# Patient Record
Sex: FEMALE | Race: OTHER | NOT HISPANIC OR LATINO | URBAN - METROPOLITAN AREA
[De-identification: names, ages, dates, MRNs, and addresses within clinical notes are randomized per-mention and may not be internally consistent; named-entity substitution may affect disease eponyms.]

---

## 2017-02-01 ENCOUNTER — OUTPATIENT (OUTPATIENT)
Dept: OUTPATIENT SERVICES | Facility: HOSPITAL | Age: 72
LOS: 1 days | End: 2017-02-01
Payer: MEDICAID

## 2017-02-01 DIAGNOSIS — Z84.89 FAMILY HISTORY OF OTHER SPECIFIED CONDITIONS: Chronic | ICD-10-CM

## 2017-02-13 DIAGNOSIS — R69 ILLNESS, UNSPECIFIED: ICD-10-CM

## 2017-02-21 ENCOUNTER — OUTPATIENT (OUTPATIENT)
Dept: OUTPATIENT SERVICES | Facility: HOSPITAL | Age: 72
LOS: 1 days | End: 2017-02-21
Payer: COMMERCIAL

## 2017-02-21 ENCOUNTER — APPOINTMENT (OUTPATIENT)
Dept: RADIOLOGY | Facility: CLINIC | Age: 72
End: 2017-02-21

## 2017-02-21 DIAGNOSIS — Z00.8 ENCOUNTER FOR OTHER GENERAL EXAMINATION: ICD-10-CM

## 2017-02-21 DIAGNOSIS — Z84.89 FAMILY HISTORY OF OTHER SPECIFIED CONDITIONS: Chronic | ICD-10-CM

## 2017-02-21 PROCEDURE — 77080 DXA BONE DENSITY AXIAL: CPT | Mod: 26

## 2017-02-21 PROCEDURE — 77080 DXA BONE DENSITY AXIAL: CPT

## 2017-05-31 ENCOUNTER — OTHER (OUTPATIENT)
Age: 72
End: 2017-05-31

## 2017-06-08 ENCOUNTER — APPOINTMENT (OUTPATIENT)
Dept: ORTHOPEDIC SURGERY | Facility: CLINIC | Age: 72
End: 2017-06-08
Payer: MEDICARE

## 2017-06-08 VITALS
HEART RATE: 76 BPM | SYSTOLIC BLOOD PRESSURE: 117 MMHG | BODY MASS INDEX: 25.3 KG/M2 | WEIGHT: 134 LBS | HEIGHT: 61 IN | DIASTOLIC BLOOD PRESSURE: 61 MMHG

## 2017-06-08 PROCEDURE — 73502 X-RAY EXAM HIP UNI 2-3 VIEWS: CPT | Mod: LT

## 2017-06-08 PROCEDURE — 99213 OFFICE O/P EST LOW 20 MIN: CPT

## 2017-06-08 RX ORDER — CYCLOSPORINE 0.5 MG/ML
0.05 EMULSION OPHTHALMIC
Qty: 60 | Refills: 0 | Status: ACTIVE | COMMUNITY
Start: 2017-05-30

## 2017-08-08 ENCOUNTER — OTHER (OUTPATIENT)
Age: 72
End: 2017-08-08

## 2017-08-09 ENCOUNTER — APPOINTMENT (OUTPATIENT)
Dept: ORTHOPEDIC SURGERY | Facility: CLINIC | Age: 72
End: 2017-08-09
Payer: MEDICARE

## 2017-08-09 VITALS
HEART RATE: 70 BPM | HEIGHT: 61 IN | DIASTOLIC BLOOD PRESSURE: 77 MMHG | BODY MASS INDEX: 25.3 KG/M2 | WEIGHT: 134 LBS | SYSTOLIC BLOOD PRESSURE: 122 MMHG

## 2017-08-09 PROCEDURE — 99213 OFFICE O/P EST LOW 20 MIN: CPT

## 2017-08-09 PROCEDURE — 73523 X-RAY EXAM HIPS BI 5/> VIEWS: CPT

## 2017-09-01 PROCEDURE — G9001: CPT

## 2017-12-11 ENCOUNTER — APPOINTMENT (OUTPATIENT)
Dept: ORTHOPEDIC SURGERY | Facility: CLINIC | Age: 72
End: 2017-12-11
Payer: MEDICARE

## 2017-12-11 PROCEDURE — 99214 OFFICE O/P EST MOD 30 MIN: CPT

## 2017-12-11 PROCEDURE — 73080 X-RAY EXAM OF ELBOW: CPT | Mod: RT

## 2018-02-27 ENCOUNTER — APPOINTMENT (OUTPATIENT)
Dept: ORTHOPEDIC SURGERY | Facility: CLINIC | Age: 73
End: 2018-02-27

## 2018-03-17 ENCOUNTER — RX RENEWAL (OUTPATIENT)
Age: 73
End: 2018-03-17

## 2018-03-19 ENCOUNTER — RX RENEWAL (OUTPATIENT)
Age: 73
End: 2018-03-19

## 2018-04-18 ENCOUNTER — APPOINTMENT (OUTPATIENT)
Dept: UROLOGY | Facility: CLINIC | Age: 73
End: 2018-04-18
Payer: MEDICARE

## 2018-04-18 VITALS
BODY MASS INDEX: 20.2 KG/M2 | HEIGHT: 61 IN | WEIGHT: 107 LBS | DIASTOLIC BLOOD PRESSURE: 73 MMHG | HEART RATE: 78 BPM | SYSTOLIC BLOOD PRESSURE: 118 MMHG | RESPIRATION RATE: 16 BRPM

## 2018-04-18 DIAGNOSIS — Z86.69 PERSONAL HISTORY OF OTHER DISEASES OF THE NERVOUS SYSTEM AND SENSE ORGANS: ICD-10-CM

## 2018-04-18 DIAGNOSIS — Z82.49 FAMILY HISTORY OF ISCHEMIC HEART DISEASE AND OTHER DISEASES OF THE CIRCULATORY SYSTEM: ICD-10-CM

## 2018-04-18 PROCEDURE — 99205 OFFICE O/P NEW HI 60 MIN: CPT

## 2018-04-18 PROCEDURE — 51798 US URINE CAPACITY MEASURE: CPT

## 2018-04-18 RX ORDER — ESTRADIOL 0.1 MG/G
0.1 CREAM VAGINAL
Qty: 4 | Refills: 3 | Status: ACTIVE | COMMUNITY
Start: 2018-04-18 | End: 1900-01-01

## 2018-04-23 LAB
A VAGINAE DNA VAG QL NAA+PROBE: NORMAL
APPEARANCE: CLEAR
BACTERIA UR CULT: ABNORMAL
BACTERIA: NEGATIVE
BILIRUBIN URINE: NEGATIVE
BLOOD URINE: NEGATIVE
BVAB2 DNA VAG QL NAA+PROBE: NORMAL
C KRUSEI DNA VAG QL NAA+PROBE: NEGATIVE
COLOR: YELLOW
CORE LAB FLUID CYTOLOGY: NORMAL
GLUCOSE QUALITATIVE U: NEGATIVE MG/DL
HYALINE CASTS: 0 /LPF
KETONES URINE: NEGATIVE
LEUKOCYTE ESTERASE URINE: NEGATIVE
MEGA1 DNA VAG QL NAA+PROBE: NORMAL
MICROSCOPIC-UA: NORMAL
NITRITE URINE: NEGATIVE
PH URINE: 7
PROTEIN URINE: NEGATIVE MG/DL
RED BLOOD CELLS URINE: 1 /HPF
SPECIFIC GRAVITY URINE: 1.01
SQUAMOUS EPITHELIAL CELLS: 0 /HPF
T VAGINALIS RRNA SPEC QL NAA+PROBE: NEGATIVE
UROBILINOGEN URINE: NEGATIVE MG/DL
WHITE BLOOD CELLS URINE: 1 /HPF

## 2018-05-16 ENCOUNTER — OTHER (OUTPATIENT)
Age: 73
End: 2018-05-16

## 2018-05-17 ENCOUNTER — APPOINTMENT (OUTPATIENT)
Dept: ORTHOPEDIC SURGERY | Facility: CLINIC | Age: 73
End: 2018-05-17

## 2018-05-30 ENCOUNTER — OTHER (OUTPATIENT)
Age: 73
End: 2018-05-30

## 2018-06-07 ENCOUNTER — APPOINTMENT (OUTPATIENT)
Dept: ORTHOPEDIC SURGERY | Facility: CLINIC | Age: 73
End: 2018-06-07
Payer: MEDICARE

## 2018-06-07 VITALS
DIASTOLIC BLOOD PRESSURE: 69 MMHG | BODY MASS INDEX: 20.2 KG/M2 | HEIGHT: 61 IN | WEIGHT: 107 LBS | SYSTOLIC BLOOD PRESSURE: 113 MMHG | TEMPERATURE: 97.9 F | HEART RATE: 79 BPM

## 2018-06-07 PROCEDURE — 73562 X-RAY EXAM OF KNEE 3: CPT | Mod: 50

## 2018-06-07 PROCEDURE — 99213 OFFICE O/P EST LOW 20 MIN: CPT

## 2018-06-07 PROCEDURE — 73502 X-RAY EXAM HIP UNI 2-3 VIEWS: CPT | Mod: RT

## 2018-06-18 ENCOUNTER — APPOINTMENT (OUTPATIENT)
Dept: UROLOGY | Facility: CLINIC | Age: 73
End: 2018-06-18
Payer: MEDICARE

## 2018-06-18 PROCEDURE — 99214 OFFICE O/P EST MOD 30 MIN: CPT

## 2018-09-06 LAB
APPEARANCE: CLEAR
BACTERIA UR CULT: NORMAL
BACTERIA: NEGATIVE
BILIRUBIN URINE: NEGATIVE
BLOOD URINE: ABNORMAL
COLOR: YELLOW
CORE LAB FLUID CYTOLOGY: NORMAL
GLUCOSE QUALITATIVE U: NEGATIVE MG/DL
KETONES URINE: NEGATIVE
LEUKOCYTE ESTERASE URINE: NEGATIVE
MICROSCOPIC-UA: NORMAL
NITRITE URINE: NEGATIVE
PH URINE: 7
PROTEIN URINE: NEGATIVE MG/DL
RED BLOOD CELLS URINE: 0 /HPF
SPECIFIC GRAVITY URINE: 1.01
SQUAMOUS EPITHELIAL CELLS: 1 /HPF
UROBILINOGEN URINE: NEGATIVE MG/DL
WHITE BLOOD CELLS URINE: 0 /HPF

## 2018-09-27 ENCOUNTER — CHART COPY (OUTPATIENT)
Age: 73
End: 2018-09-27

## 2018-10-03 LAB — BACTERIA UR CULT: NORMAL

## 2018-12-05 ENCOUNTER — APPOINTMENT (OUTPATIENT)
Dept: UROLOGY | Facility: CLINIC | Age: 73
End: 2018-12-05
Payer: MEDICARE

## 2018-12-05 PROCEDURE — 99215 OFFICE O/P EST HI 40 MIN: CPT

## 2018-12-07 LAB — BACTERIA UR CULT: NORMAL

## 2019-04-01 ENCOUNTER — APPOINTMENT (OUTPATIENT)
Dept: ORTHOPEDIC SURGERY | Facility: CLINIC | Age: 74
End: 2019-04-01
Payer: COMMERCIAL

## 2019-04-01 PROCEDURE — 99213 OFFICE O/P EST LOW 20 MIN: CPT

## 2019-04-01 PROCEDURE — 73030 X-RAY EXAM OF SHOULDER: CPT | Mod: LT

## 2019-04-01 NOTE — HISTORY OF PRESENT ILLNESS
[de-identified] : Patient is here for right elbow pain that she states has been worsening over the past year without any injury. She states that she has been following up with a Rheumatologist for her RA and osteoporosis. She has been wearing a brace at home but feels like she needs a custom made one as hers does not fit properly. She does not take any outside pain medications. She states that her function is becoming limited and she is also noticing it in her left arm and shoulder as well.

## 2019-04-01 NOTE — PHYSICAL EXAM
[de-identified] : Constitutional: Well-nourished, well-developed, No acute distress\par Respiratory: Good respiratory effort, no SOB\par Lymphatic: No regional lymphadenopathy, no lymphedema\par Psychiatric: Pleasant and normal affect, alert and oriented x3\par Skin: Clean dry and intact B/L UE/LE\par Musculoskeletal: normal except where as noted in regional exam\par \par Left Shoulder:\par APPEARANCE: no marked deformities, no swelling or malalignment\par POSITIVE TENDERNESS: + Diffusely throughout the anterior/lateral/posterior shoulder, + anterior/posterior capsule\par NONTENDER: supraspinatus, infraspinatus, teres minor. biceps. AC joint. \par ROM: Significantly limited in all planes active and passive motion \par RESISTIVE TESTING: + pain, 4/5 resisted flex/ext, empty can/ER/IR, horizontal abd/add \par SPECIAL TESTS: + apley's scratch test for limited motion and pain, + drop arm for pain without sig weakness, + empty can for pain without sig weakness\par Vasc: 2+ radial pulse\par Neuro: AIN, PIN, Ulnar nerve intact to motor, DTRs 2+/4 biceps, triceps, brachioradialis\par Sensation: Intact to light touch throughout\par \par Bilateral Elbows:\par APPEARANCE: Mild swelling, + deformity with irregular articulation of the bilateral elbow joints \par POSITIVE TENDERNESS: Diffusely around all portions of the elbow\par NONTENDER: none \par ROM: full flexion, extension limited by approximately 10°, normal pronation/supination. \par RESISTIVE TESTING: Painful 4/5 resisted elbow flexion/extension, wrist/long finger extension. painless resisted wrist/long finger flexion. painless resisted supination/pronation. \par Vasc: 2+ radial pulse\par Neuro: AIN, PIN, Ulnar nerve intact to motor\par Sensation: Intact to light touch throughout\par  [de-identified] : The following radiographs were ordered and read by me during this patient's visit. I reviewed each radiograph in detail with the patient and discussed the findings as highlighted below. 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  No fracture, or dislocation seen at this time. There is no malalignment. No other obvious osseous abnormality. Otherwise unremarkable.\par \par

## 2019-04-01 NOTE — DISCUSSION/SUMMARY
[de-identified] : Patient was seen today for reevaluation of bilateral elbow pain. 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 Patient was encouraged to continue with physical therapy and activities as tolerated as she has good functional range of motion. At this time she has increased bilateral elbow pain, but still has the same level of function regarding her range of motion and ability to perform her ADLs. I advised the patient that a new course of physical therapy would be recommended at this time. I advised the patient that I am a nonsurgical sports medicine provider, I do not provide management severe end-stage rheumatoid joints, she is advised to continue management with her rheumatologist, however is she feels she can not proceed any further with nonsurgical management, she be recommended to follow up with one of my surgical associates to discuss total elbow replacement surgery. Patient is not interested in surgical intervention at this time.\par I educated the patient that she has endstage arthritis in her left shoulder as well, she is bone-on-bone deformation of the humeral head on the glenoid, as well as with bone-on-bone contact in the femoral head and the acromion process. Patient does not have any shoulder pain specifically, just stiffness and limited motion. I advised cortisone injections would be indicated for pain control, however physical therapy would be the best nonsurgical management at this time. If she wishes to have a consultation with one of my shoulder replacement surgeons, she may do so, but she is not interested in surgical intervention in this location either. Patient and her  appreciate and agree with current plan.  \par \par This note was generated using dragon medical dictation software.  A reasonable effort has been made for proofreading its contents, but typos may still remain.  If there are any questions or points of clarification needed please notify my office.\par \par

## 2019-04-08 ENCOUNTER — OTHER (OUTPATIENT)
Age: 74
End: 2019-04-08

## 2019-04-09 ENCOUNTER — APPOINTMENT (OUTPATIENT)
Dept: ORTHOPEDIC SURGERY | Facility: CLINIC | Age: 74
End: 2019-04-09
Payer: MEDICARE

## 2019-04-09 VITALS
TEMPERATURE: 97.7 F | DIASTOLIC BLOOD PRESSURE: 78 MMHG | WEIGHT: 100 LBS | BODY MASS INDEX: 18.88 KG/M2 | HEART RATE: 76 BPM | SYSTOLIC BLOOD PRESSURE: 137 MMHG | HEIGHT: 61 IN

## 2019-04-09 PROCEDURE — 73562 X-RAY EXAM OF KNEE 3: CPT

## 2019-04-09 PROCEDURE — 99213 OFFICE O/P EST LOW 20 MIN: CPT

## 2019-04-09 NOTE — HISTORY OF PRESENT ILLNESS
[de-identified] : Patient was seen with  for right knee pain. She is status post knee replacement in 1996 in [place]. Patient reports over the past year she started to develop right knee discomfort. She notes pain particularly when lying in bed on the left lateral side. She does not have any pain when sitting in a chair and swinging her knee back and forth. She has limited ambulation. She does not report of pain when she ambulates, she does use a walker. She is not any fevers or chills. No knee injuries or trauma she reported. No swelling of the knee is reported.

## 2019-04-09 NOTE — PHYSICAL EXAM
[de-identified] : The patient appears well nourished  and in no apparent distress.  The patient is alert and oriented to person, place, and time.   Affect and mood appear normal. The head is normocephalic and atraumatic.  The eyes reveal normal sclera and extra ocular muscles are intact. The tongue is midline with no apparent lesions.  Skin shows normal turgor with no evidence of eczema or psoriasis.  No respiratory distress noted.  Sensation grossly intact.\par   [de-identified] : Xray- 3 views of the right knee - well aligned and well fixed right knee replacement.  [de-identified] : Exam of the right knee shows a well healed incision. No effusion, no instability, 0-110 degrees of flexion without pain. 5/5 motor strength bilaterally distally. Sensation intact distally.

## 2019-04-09 NOTE — DISCUSSION/SUMMARY
[de-identified] : The patient is a 73 year old female s/p right knee replacement by another surgeon. On exam there is no instability with ROM and a well aligned and well fixed right knee replacement on Xray's with no pain in the office on exam. She was recommended to continue home strengthening exercises. I don't see any indication for any real concern regarding the knee.  She may follow up as needed.

## 2019-04-09 NOTE — ADDENDUM
[FreeTextEntry1] : This note was authored by Tha Marina working as a medical scribe for Dr. Bernard Kumar. The note was reviewed, edited, and revised by Dr. Bernard Kumar whom is in agreement with the exam findings, imaging findings, and treatment plan. 04/09/2019.

## 2019-05-10 ENCOUNTER — APPOINTMENT (OUTPATIENT)
Dept: RHEUMATOLOGY | Facility: CLINIC | Age: 74
End: 2019-05-10
Payer: MEDICARE

## 2019-05-10 VITALS
OXYGEN SATURATION: 97 % | DIASTOLIC BLOOD PRESSURE: 67 MMHG | HEART RATE: 76 BPM | HEIGHT: 61 IN | BODY MASS INDEX: 18.5 KG/M2 | SYSTOLIC BLOOD PRESSURE: 105 MMHG | WEIGHT: 98 LBS

## 2019-05-10 PROCEDURE — 99205 OFFICE O/P NEW HI 60 MIN: CPT

## 2019-05-13 NOTE — DATA REVIEWED
[FreeTextEntry1] : Reviewed images of xrays that  brought on his phone through the portal\par subluxation of the elbows and shoulder.

## 2019-05-13 NOTE — PHYSICAL EXAM
[General Appearance - Alert] : alert [General Appearance - In No Acute Distress] : in no acute distress [Sclera] : the sclera and conjunctiva were normal [PERRL With Normal Accommodation] : pupils were equal in size, round, and reactive to light [Extraocular Movements] : extraocular movements were intact [Outer Ear] : the ears and nose were normal in appearance [Oropharynx] : the oropharynx was normal [Neck Appearance] : the appearance of the neck was normal [Neck Cervical Mass (___cm)] : no neck mass was observed [Jugular Venous Distention Increased] : there was no jugular-venous distention [Thyroid Diffuse Enlargement] : the thyroid was not enlarged [Thyroid Nodule] : there were no palpable thyroid nodules [Auscultation Breath Sounds / Voice Sounds] : lungs were clear to auscultation bilaterally [Heart Rate And Rhythm] : heart rate was normal and rhythm regular [Heart Sounds] : normal S1 and S2 [Heart Sounds Gallop] : no gallops [Murmurs] : no murmurs [Heart Sounds Pericardial Friction Rub] : no pericardial rub [Edema] : there was no peripheral edema [No CVA Tenderness] : no ~M costovertebral angle tenderness [Skin Color & Pigmentation] : normal skin color and pigmentation [Skin Turgor] : normal skin turgor [] : no rash [Deep Tendon Reflexes (DTR)] : deep tendon reflexes were 2+ and symmetric [Sensation] : the sensory exam was normal to light touch and pinprick [No Focal Deficits] : no focal deficits [FreeTextEntry1] : wheelchair - gait with decrased flexion at hip and knees [Oriented To Time, Place, And Person] : oriented to person, place, and time [Impaired Insight] : insight and judgment were intact [Affect] : the affect was normal [Mood] : the mood was normal

## 2019-05-13 NOTE — ASSESSMENT
[FreeTextEntry1] : 74 yo with severe deforming RA now with subluxation of multiple joints and s/p bilateral knee and hip replacements. \par \par #RA\par - joints cool and not inflamed today - ? burnt out\par - continue orencia and methotrexate for now - \par \par #shoulder and elbows\par -main disability is in the joints that are subluxed elbows and shoulder.   \par - rec shoulder evaluation\par \par #MTP pain and base-\par - ortho foot from chronic [facility]\par \par #v58.69\par -check labs\par -check quant\par

## 2019-05-13 NOTE — HISTORY OF PRESENT ILLNESS
[FreeTextEntry1] : 73 year old with erosive RA for years here to transfer care. \par \par # RA - developed at age 25.  Was severe at the beginning with inflammation.  Initially was treated in [place] with years of steroids and antiinflammatory only.  [place] in [place].  Remembers being on Enbrel, Humira and most recently methotrexate and Orencia (SQ).    Has had both knees and hip replaced at this point.  mother had RA as well.   Currently denies inflammation - there is no morning stiffness and her joints don’t get swollen or red any longer.  She has little pain in her joints except for those that have dislocated and subluxed.  Her biggest limitation and source of pain currently is her elbows (both) and her left shoulder.  She also gets low back pain.  all these symptoms are chronic for at least a year.\par \par #joint replacement hx\par -1996 bkr in [facility] \par -2003 right hip replacement but wasn’t able to get exact size but a larger hip was used -\par -2007 replaced left\par -2010 right hip revision with a femur\par -2014 right knee revision (nagi renee)\par \par #OP - unsure of last bone density - has been on monthly boniva.  \par  [None] : The patient is currently asymptomatic

## 2019-05-15 ENCOUNTER — RX RENEWAL (OUTPATIENT)
Age: 74
End: 2019-05-15

## 2019-05-15 LAB
25(OH)D3 SERPL-MCNC: 43.4 NG/ML
ALBUMIN SERPL ELPH-MCNC: 4.4 G/DL
ALP BLD-CCNC: 64 U/L
ALT SERPL-CCNC: 13 U/L
ANION GAP SERPL CALC-SCNC: 15 MMOL/L
AST SERPL-CCNC: 20 U/L
BASOPHILS # BLD AUTO: 0.04 K/UL
BASOPHILS NFR BLD AUTO: 0.5 %
BILIRUB SERPL-MCNC: 0.4 MG/DL
BUN SERPL-MCNC: 18 MG/DL
CALCIUM SERPL-MCNC: 10 MG/DL
CHLORIDE SERPL-SCNC: 97 MMOL/L
CO2 SERPL-SCNC: 23 MMOL/L
CREAT SERPL-MCNC: 0.71 MG/DL
CRP SERPL-MCNC: 0.59 MG/DL
EOSINOPHIL # BLD AUTO: 0.23 K/UL
EOSINOPHIL NFR BLD AUTO: 3 %
ERYTHROCYTE [SEDIMENTATION RATE] IN BLOOD BY WESTERGREN METHOD: 77 MM/HR
ESTIMATED AVERAGE GLUCOSE: 114 MG/DL
HBA1C MFR BLD HPLC: 5.6 %
HBV SURFACE AB SERPL IA-ACNC: <3 MIU/ML
HCT VFR BLD CALC: 39 %
HCV AB SER QL: NONREACTIVE
HCV S/CO RATIO: 0.06 S/CO
HGB BLD-MCNC: 12.9 G/DL
IMM GRANULOCYTES NFR BLD AUTO: 0.1 %
LYMPHOCYTES # BLD AUTO: 2.17 K/UL
LYMPHOCYTES NFR BLD AUTO: 28.4 %
MAN DIFF?: NORMAL
MCHC RBC-ENTMCNC: 31.2 PG
MCHC RBC-ENTMCNC: 33.1 GM/DL
MCV RBC AUTO: 94.2 FL
MONOCYTES # BLD AUTO: 0.55 K/UL
MONOCYTES NFR BLD AUTO: 7.2 %
NEUTROPHILS # BLD AUTO: 4.63 K/UL
NEUTROPHILS NFR BLD AUTO: 60.8 %
PLATELET # BLD AUTO: 214 K/UL
POTASSIUM SERPL-SCNC: 4.6 MMOL/L
PROT SERPL-MCNC: 7.5 G/DL
RBC # BLD: 4.14 M/UL
RBC # FLD: 14.4 %
RHEUMATOID FACT SER QL: 611 IU/ML
SODIUM SERPL-SCNC: 135 MMOL/L
WBC # FLD AUTO: 7.63 K/UL

## 2019-05-15 RX ORDER — ADALIMUMAB 40MG/0.8ML
40 KIT SUBCUTANEOUS
Qty: 2 | Refills: 0 | Status: DISCONTINUED | COMMUNITY
Start: 2017-05-27 | End: 2019-05-15

## 2019-05-15 RX ORDER — ALENDRONATE SODIUM 70 MG/1
70 TABLET ORAL
Qty: 4 | Refills: 0 | Status: DISCONTINUED | COMMUNITY
Start: 2017-05-27 | End: 2019-05-15

## 2019-05-16 ENCOUNTER — RX RENEWAL (OUTPATIENT)
Age: 74
End: 2019-05-16

## 2019-05-16 LAB
CCP AB SER IA-ACNC: >250 UNITS
ENA SS-A AB SER IA-ACNC: <0.2 AL
ENA SS-B AB SER IA-ACNC: <0.2 AL
RF+CCP IGG SER-IMP: ABNORMAL

## 2019-05-17 LAB
HAV IGM SER QL: NONREACTIVE
HBV CORE IGM SER QL: NONREACTIVE
HBV SURFACE AG SER QL: NONREACTIVE
HCV AB SER QL: NONREACTIVE
HCV S/CO RATIO: 0.06 S/CO

## 2019-05-21 LAB
M TB IFN-G BLD-IMP: NEGATIVE
QUANTIFERON TB PLUS MITOGEN MINUS NIL: >10 IU/ML
QUANTIFERON TB PLUS NIL: 0.03 IU/ML
QUANTIFERON TB PLUS TB1 MINUS NIL: -0.01 IU/ML
QUANTIFERON TB PLUS TB2 MINUS NIL: -0.01 IU/ML

## 2019-06-03 ENCOUNTER — APPOINTMENT (OUTPATIENT)
Dept: ORTHOPEDIC SURGERY | Facility: CLINIC | Age: 74
End: 2019-06-03
Payer: MEDICARE

## 2019-06-03 VITALS
WEIGHT: 98 LBS | HEIGHT: 61 IN | SYSTOLIC BLOOD PRESSURE: 113 MMHG | DIASTOLIC BLOOD PRESSURE: 73 MMHG | BODY MASS INDEX: 18.5 KG/M2 | HEART RATE: 73 BPM

## 2019-06-03 PROCEDURE — 99213 OFFICE O/P EST LOW 20 MIN: CPT

## 2019-07-04 ENCOUNTER — FORM ENCOUNTER (OUTPATIENT)
Age: 74
End: 2019-07-04

## 2019-07-05 ENCOUNTER — APPOINTMENT (OUTPATIENT)
Dept: RHEUMATOLOGY | Facility: CLINIC | Age: 74
End: 2019-07-05
Payer: MEDICARE

## 2019-07-05 ENCOUNTER — OUTPATIENT (OUTPATIENT)
Dept: OUTPATIENT SERVICES | Facility: HOSPITAL | Age: 74
LOS: 1 days | End: 2019-07-05
Payer: COMMERCIAL

## 2019-07-05 ENCOUNTER — APPOINTMENT (OUTPATIENT)
Dept: RADIOLOGY | Facility: CLINIC | Age: 74
End: 2019-07-05
Payer: MEDICARE

## 2019-07-05 VITALS
OXYGEN SATURATION: 99 % | WEIGHT: 98 LBS | BODY MASS INDEX: 18.5 KG/M2 | HEART RATE: 72 BPM | SYSTOLIC BLOOD PRESSURE: 122 MMHG | DIASTOLIC BLOOD PRESSURE: 73 MMHG | HEIGHT: 61 IN

## 2019-07-05 DIAGNOSIS — M06.9 RHEUMATOID ARTHRITIS, UNSPECIFIED: ICD-10-CM

## 2019-07-05 DIAGNOSIS — Z84.89 FAMILY HISTORY OF OTHER SPECIFIED CONDITIONS: Chronic | ICD-10-CM

## 2019-07-05 PROCEDURE — 99214 OFFICE O/P EST MOD 30 MIN: CPT

## 2019-07-05 PROCEDURE — 73660 X-RAY EXAM OF TOE(S): CPT | Mod: 26,50

## 2019-07-05 PROCEDURE — 73030 X-RAY EXAM OF SHOULDER: CPT | Mod: 26,LT

## 2019-07-05 PROCEDURE — 73600 X-RAY EXAM OF ANKLE: CPT | Mod: 26,50

## 2019-07-05 PROCEDURE — 73030 X-RAY EXAM OF SHOULDER: CPT

## 2019-07-05 PROCEDURE — 73600 X-RAY EXAM OF ANKLE: CPT

## 2019-07-05 PROCEDURE — 73660 X-RAY EXAM OF TOE(S): CPT

## 2019-07-06 LAB
ALBUMIN SERPL ELPH-MCNC: 4.3 G/DL
ALP BLD-CCNC: 56 U/L
ALT SERPL-CCNC: 10 U/L
ANION GAP SERPL CALC-SCNC: 12 MMOL/L
AST SERPL-CCNC: 21 U/L
BASOPHILS # BLD AUTO: 0.04 K/UL
BASOPHILS NFR BLD AUTO: 0.4 %
BILIRUB SERPL-MCNC: 0.4 MG/DL
BUN SERPL-MCNC: 12 MG/DL
CALCIUM SERPL-MCNC: 9.9 MG/DL
CHLORIDE SERPL-SCNC: 94 MMOL/L
CO2 SERPL-SCNC: 23 MMOL/L
CREAT SERPL-MCNC: 0.67 MG/DL
CRP SERPL-MCNC: 0.14 MG/DL
EOSINOPHIL # BLD AUTO: 0.19 K/UL
EOSINOPHIL NFR BLD AUTO: 1.9 %
ERYTHROCYTE [SEDIMENTATION RATE] IN BLOOD BY WESTERGREN METHOD: 70 MM/HR
GLUCOSE SERPL-MCNC: 94 MG/DL
HCT VFR BLD CALC: 39.3 %
HGB BLD-MCNC: 13 G/DL
IMM GRANULOCYTES NFR BLD AUTO: 0.4 %
LYMPHOCYTES # BLD AUTO: 1.9 K/UL
LYMPHOCYTES NFR BLD AUTO: 19.1 %
MAN DIFF?: NORMAL
MCHC RBC-ENTMCNC: 31.4 PG
MCHC RBC-ENTMCNC: 33.1 GM/DL
MCV RBC AUTO: 94.9 FL
MONOCYTES # BLD AUTO: 0.78 K/UL
MONOCYTES NFR BLD AUTO: 7.8 %
NEUTROPHILS # BLD AUTO: 7 K/UL
NEUTROPHILS NFR BLD AUTO: 70.4 %
PLATELET # BLD AUTO: 204 K/UL
POTASSIUM SERPL-SCNC: 4.6 MMOL/L
PROT SERPL-MCNC: 7 G/DL
RBC # BLD: 4.14 M/UL
RBC # FLD: 14.2 %
SODIUM SERPL-SCNC: 129 MMOL/L
WBC # FLD AUTO: 9.95 K/UL

## 2019-07-08 NOTE — HISTORY OF PRESENT ILLNESS
[None] : The patient is currently asymptomatic [FreeTextEntry1] : - since last visit shoulder and elbow are better.  saw the shoulder specialist who said not much  more to do at this poitn\par - feet and ankles most problem now - pain to walk on them. specially made shoes not helpful\par - no ams\par - no joisnt swelling\par - tolerates meds fine

## 2019-07-08 NOTE — CONSULT LETTER
[Dear  ___] : Dear  [unfilled], [Consult Letter:] : I had the pleasure of evaluating your patient, [unfilled]. [Please see my note below.] : Please see my note below. [Consult Closing:] : Thank you very much for allowing me to participate in the care of this patient.  If you have any questions, please do not hesitate to contact me. [Sincerely,] : Sincerely, [DrElisha  ___] : Dr. HERNÁNDEZ [FreeTextEntry2] : Krysten Ng\par Pa Ramos

## 2019-07-08 NOTE — PHYSICAL EXAM
[General Appearance - In No Acute Distress] : in no acute distress [General Appearance - Alert] : alert [Sclera] : the sclera and conjunctiva were normal [PERRL With Normal Accommodation] : pupils were equal in size, round, and reactive to light [Extraocular Movements] : extraocular movements were intact [Outer Ear] : the ears and nose were normal in appearance [Oropharynx] : the oropharynx was normal [Neck Appearance] : the appearance of the neck was normal [Neck Cervical Mass (___cm)] : no neck mass was observed [Jugular Venous Distention Increased] : there was no jugular-venous distention [Thyroid Diffuse Enlargement] : the thyroid was not enlarged [Thyroid Nodule] : there were no palpable thyroid nodules [Auscultation Breath Sounds / Voice Sounds] : lungs were clear to auscultation bilaterally [Heart Rate And Rhythm] : heart rate was normal and rhythm regular [Heart Sounds] : normal S1 and S2 [Heart Sounds Gallop] : no gallops [Murmurs] : no murmurs [Heart Sounds Pericardial Friction Rub] : no pericardial rub [Edema] : there was no peripheral edema [No CVA Tenderness] : no ~M costovertebral angle tenderness [Skin Color & Pigmentation] : normal skin color and pigmentation [Skin Turgor] : normal skin turgor [] : no rash [Deep Tendon Reflexes (DTR)] : deep tendon reflexes were 2+ and symmetric [Sensation] : the sensory exam was normal to light touch and pinprick [No Focal Deficits] : no focal deficits [Oriented To Time, Place, And Person] : oriented to person, place, and time [Impaired Insight] : insight and judgment were intact [Affect] : the affect was normal [Mood] : the mood was normal [FreeTextEntry1] : erosive, subluxed joints bilaterally including mcps bilaterally.  no warmth or boggy sunovitis.  elbows with full flexion -shoulders restricted but no pain - cool and no erythema.  MTP at based ttp with fallen arches and rom

## 2019-07-08 NOTE — ASSESSMENT
[FreeTextEntry1] : 74 yo with severe deforming RA now with subluxation of multiple joints and s/p bilateral knee and hip replacements. \par \par get orthotic name and phone and call \par xray to r/o fracture\par [phone] call \par \par #RA\par - joints cool and not inflamed today - ? burnt out\par - continue orencia and methotrexate for now - \par \par #shoulder and elbows\par -main disability is in the joints that are subluxed elbows and shoulder.   \par - rec shoulder evaluation\par \par #MTP pain and base-\par - ortho foot from [facility]\par \par #v58.69\par -check labs\par -check quant\par

## 2019-07-10 ENCOUNTER — CLINICAL ADVICE (OUTPATIENT)
Age: 74
End: 2019-07-10

## 2019-07-16 ENCOUNTER — MESSAGE (OUTPATIENT)
Age: 74
End: 2019-07-16

## 2019-09-11 LAB
ANION GAP SERPL CALC-SCNC: 11 MMOL/L
BUN SERPL-MCNC: 13 MG/DL
CALCIUM SERPL-MCNC: 9.6 MG/DL
CHLORIDE SERPL-SCNC: 95 MMOL/L
CO2 SERPL-SCNC: 21 MMOL/L
CREAT SERPL-MCNC: 0.63 MG/DL
GLUCOSE SERPL-MCNC: 123 MG/DL
POTASSIUM SERPL-SCNC: 5.3 MMOL/L
SODIUM SERPL-SCNC: 127 MMOL/L

## 2019-09-13 ENCOUNTER — APPOINTMENT (OUTPATIENT)
Dept: RHEUMATOLOGY | Facility: CLINIC | Age: 74
End: 2019-09-13
Payer: MEDICARE

## 2019-09-13 VITALS
SYSTOLIC BLOOD PRESSURE: 115 MMHG | OXYGEN SATURATION: 97 % | HEART RATE: 98 BPM | HEIGHT: 61 IN | WEIGHT: 98 LBS | BODY MASS INDEX: 18.5 KG/M2 | RESPIRATION RATE: 14 BRPM | DIASTOLIC BLOOD PRESSURE: 55 MMHG

## 2019-09-13 PROCEDURE — 99214 OFFICE O/P EST MOD 30 MIN: CPT

## 2019-09-16 ENCOUNTER — TRANSCRIPTION ENCOUNTER (OUTPATIENT)
Age: 74
End: 2019-09-16

## 2019-09-16 NOTE — ASSESSMENT
[FreeTextEntry1] : 74 yo with severe deforming RA now with subluxation of multiple joints and s/p bilateral knee and hip replacements. \par \par #RA\par - joints cool and not inflamed today - ? burnt out\par - continue orencia and methotrexate for now as inflammatory markers slightly increased\par \par #shoulder and elbows\par -main disability is in the joints that are subluxed elbows and shoulder.   \par -shoulder and foot ankle ortho on board\par \par #hyponatremia\par - discussed at length with patient anid  - needs followup with internist\par - is taking in less free water\par #MTP pain and base-\par - ortho foot from chronic [facility]\par \par #v58.69\par -check labs\par -check quant\par

## 2019-09-16 NOTE — PHYSICAL EXAM
[General Appearance - Alert] : alert [General Appearance - In No Acute Distress] : in no acute distress [Sclera] : the sclera and conjunctiva were normal [Outer Ear] : the ears and nose were normal in appearance [Extraocular Movements] : extraocular movements were intact [PERRL With Normal Accommodation] : pupils were equal in size, round, and reactive to light [Oropharynx] : the oropharynx was normal [Neck Appearance] : the appearance of the neck was normal [Jugular Venous Distention Increased] : there was no jugular-venous distention [Neck Cervical Mass (___cm)] : no neck mass was observed [Thyroid Nodule] : there were no palpable thyroid nodules [Thyroid Diffuse Enlargement] : the thyroid was not enlarged [Auscultation Breath Sounds / Voice Sounds] : lungs were clear to auscultation bilaterally [Heart Rate And Rhythm] : heart rate was normal and rhythm regular [Heart Sounds] : normal S1 and S2 [Heart Sounds Gallop] : no gallops [Murmurs] : no murmurs [Heart Sounds Pericardial Friction Rub] : no pericardial rub [No CVA Tenderness] : no ~M costovertebral angle tenderness [Edema] : there was no peripheral edema [Skin Color & Pigmentation] : normal skin color and pigmentation [Skin Turgor] : normal skin turgor [Deep Tendon Reflexes (DTR)] : deep tendon reflexes were 2+ and symmetric [] : no rash [No Focal Deficits] : no focal deficits [Sensation] : the sensory exam was normal to light touch and pinprick [Oriented To Time, Place, And Person] : oriented to person, place, and time [Impaired Insight] : insight and judgment were intact [Mood] : the mood was normal [Affect] : the affect was normal [FreeTextEntry1] : wheelchair - gait with decrased flexion at hip and knees

## 2019-09-16 NOTE — HISTORY OF PRESENT ILLNESS
[None] : The patient is currently asymptomatic [FreeTextEntry1] : INTERVAL HX\par - since last visit shoulder and elbow are better.  saw the shoulder specialist who said not much  more to do at this poitn\par - doesn’t think the shoes that dr yañez prescribed will work because they are so heavy.  is thinking about it \par - no ams\par - no joisnt swelling\par - tolerates meds fine

## 2019-11-13 ENCOUNTER — MESSAGE (OUTPATIENT)
Age: 74
End: 2019-11-13

## 2019-11-14 LAB
25(OH)D3 SERPL-MCNC: 44.9 NG/ML
BASOPHILS # BLD AUTO: 0.06 K/UL
BASOPHILS NFR BLD AUTO: 0.6 %
CRP SERPL-MCNC: 0.72 MG/DL
EOSINOPHIL # BLD AUTO: 0.16 K/UL
EOSINOPHIL NFR BLD AUTO: 1.6 %
ERYTHROCYTE [SEDIMENTATION RATE] IN BLOOD BY WESTERGREN METHOD: 63 MM/HR
HCT VFR BLD CALC: 37.6 %
HGB BLD-MCNC: 12.6 G/DL
IMM GRANULOCYTES NFR BLD AUTO: 0.6 %
LYMPHOCYTES # BLD AUTO: 1.49 K/UL
LYMPHOCYTES NFR BLD AUTO: 15.1 %
MAN DIFF?: NORMAL
MCHC RBC-ENTMCNC: 31.7 PG
MCHC RBC-ENTMCNC: 33.5 GM/DL
MCV RBC AUTO: 94.7 FL
MONOCYTES # BLD AUTO: 0.81 K/UL
MONOCYTES NFR BLD AUTO: 8.2 %
NEUTROPHILS # BLD AUTO: 7.32 K/UL
NEUTROPHILS NFR BLD AUTO: 73.9 %
PLATELET # BLD AUTO: 250 K/UL
RBC # BLD: 3.97 M/UL
RBC # FLD: 13.8 %
TSH SERPL-ACNC: 0.41 UIU/ML
WBC # FLD AUTO: 9.9 K/UL

## 2019-11-19 ENCOUNTER — APPOINTMENT (OUTPATIENT)
Dept: RHEUMATOLOGY | Facility: CLINIC | Age: 74
End: 2019-11-19
Payer: MEDICARE

## 2019-11-19 VITALS
BODY MASS INDEX: 18.5 KG/M2 | HEIGHT: 61 IN | DIASTOLIC BLOOD PRESSURE: 62 MMHG | SYSTOLIC BLOOD PRESSURE: 129 MMHG | WEIGHT: 98 LBS | HEART RATE: 80 BPM | OXYGEN SATURATION: 97 % | RESPIRATION RATE: 14 BRPM

## 2019-11-19 PROCEDURE — 99215 OFFICE O/P EST HI 40 MIN: CPT

## 2019-11-19 RX ORDER — MELOXICAM 7.5 MG/1
7.5 TABLET ORAL TWICE DAILY
Qty: 30 | Refills: 0 | Status: COMPLETED | COMMUNITY
Start: 2019-09-30 | End: 2019-11-19

## 2019-11-19 NOTE — PHYSICAL EXAM
[General Appearance - Alert] : alert [General Appearance - In No Acute Distress] : in no acute distress [Sclera] : the sclera and conjunctiva were normal [PERRL With Normal Accommodation] : pupils were equal in size, round, and reactive to light [Extraocular Movements] : extraocular movements were intact [Outer Ear] : the ears and nose were normal in appearance [Oropharynx] : the oropharynx was normal [Neck Appearance] : the appearance of the neck was normal [Neck Cervical Mass (___cm)] : no neck mass was observed [Jugular Venous Distention Increased] : there was no jugular-venous distention [Thyroid Diffuse Enlargement] : the thyroid was not enlarged [Thyroid Nodule] : there were no palpable thyroid nodules [Auscultation Breath Sounds / Voice Sounds] : lungs were clear to auscultation bilaterally [Heart Rate And Rhythm] : heart rate was normal and rhythm regular [Heart Sounds] : normal S1 and S2 [Heart Sounds Gallop] : no gallops [Murmurs] : no murmurs [Heart Sounds Pericardial Friction Rub] : no pericardial rub [Edema] : there was no peripheral edema [No CVA Tenderness] : no ~M costovertebral angle tenderness [Skin Color & Pigmentation] : normal skin color and pigmentation [Skin Turgor] : normal skin turgor [] : no rash [Deep Tendon Reflexes (DTR)] : deep tendon reflexes were 2+ and symmetric [Sensation] : the sensory exam was normal to light touch and pinprick [No Focal Deficits] : no focal deficits [Oriented To Time, Place, And Person] : oriented to person, place, and time [Impaired Insight] : insight and judgment were intact [Affect] : the affect was normal [Mood] : the mood was normal [FreeTextEntry1] : wheelchair - gait with decrased flexion at hip and knees

## 2019-11-19 NOTE — HISTORY OF PRESENT ILLNESS
[None] : The patient is currently asymptomatic [FreeTextEntry1] : INTERVAL HX\par - since last visit shoulder and elbow are better. \par - increased the mtx to 6 tabs weekly - there has been a noticeable improvement in the pain in the joints.    states that he no longer has to rub creams onto the joints to ease the discomfort\par - did develop mouth sores however - didn’t have them in the past.   went to an oral surgeon who gave a wash - which she didn’t like and cannot tolerate.  currently taking 1 tab of folate but not any wash.\par - no ams\par - tolerates meds fine from a gi perspective.   daughter gave her a natural product for heartburn which has been working quite well\par - anxiety has become worse now.   was given a prescription by the PCP for paroxetine - paxel.  but a friend of hers told her not to take it.  she is concerned about taking it now.  is open to other options

## 2019-11-25 ENCOUNTER — RX RENEWAL (OUTPATIENT)
Age: 74
End: 2019-11-25

## 2019-11-26 ENCOUNTER — MESSAGE (OUTPATIENT)
Age: 74
End: 2019-11-26

## 2019-11-27 ENCOUNTER — CLINICAL ADVICE (OUTPATIENT)
Age: 74
End: 2019-11-27

## 2019-12-02 ENCOUNTER — MESSAGE (OUTPATIENT)
Age: 74
End: 2019-12-02

## 2019-12-17 ENCOUNTER — APPOINTMENT (OUTPATIENT)
Dept: RHEUMATOLOGY | Facility: CLINIC | Age: 74
End: 2019-12-17

## 2020-02-06 LAB
25(OH)D3 SERPL-MCNC: 49.2 NG/ML
ALBUMIN SERPL ELPH-MCNC: 4.2 G/DL
ALP BLD-CCNC: 68 U/L
ALT SERPL-CCNC: 9 U/L
ANION GAP SERPL CALC-SCNC: 11 MMOL/L
AST SERPL-CCNC: 21 U/L
BASOPHILS # BLD AUTO: 0.05 K/UL
BASOPHILS NFR BLD AUTO: 0.4 %
BILIRUB SERPL-MCNC: 0.2 MG/DL
BUN SERPL-MCNC: 16 MG/DL
CALCIUM SERPL-MCNC: 9.5 MG/DL
CHLORIDE SERPL-SCNC: 96 MMOL/L
CO2 SERPL-SCNC: 23 MMOL/L
CREAT SERPL-MCNC: 0.98 MG/DL
CRP SERPL-MCNC: 0.28 MG/DL
EOSINOPHIL # BLD AUTO: 0.21 K/UL
EOSINOPHIL NFR BLD AUTO: 1.9 %
ERYTHROCYTE [SEDIMENTATION RATE] IN BLOOD BY WESTERGREN METHOD: 88 MM/HR
HCT VFR BLD CALC: 40.1 %
HGB BLD-MCNC: 13 G/DL
IMM GRANULOCYTES NFR BLD AUTO: 0.6 %
LYMPHOCYTES # BLD AUTO: 2.16 K/UL
LYMPHOCYTES NFR BLD AUTO: 19.1 %
MAN DIFF?: NORMAL
MCHC RBC-ENTMCNC: 31.5 PG
MCHC RBC-ENTMCNC: 32.4 GM/DL
MCV RBC AUTO: 97.1 FL
MONOCYTES # BLD AUTO: 0.6 K/UL
MONOCYTES NFR BLD AUTO: 5.3 %
NEUTROPHILS # BLD AUTO: 8.21 K/UL
NEUTROPHILS NFR BLD AUTO: 72.7 %
PLATELET # BLD AUTO: 226 K/UL
POTASSIUM SERPL-SCNC: 4.5 MMOL/L
PROT SERPL-MCNC: 7 G/DL
RBC # BLD: 4.13 M/UL
RBC # FLD: 14.3 %
SODIUM SERPL-SCNC: 130 MMOL/L
WBC # FLD AUTO: 11.3 K/UL

## 2020-02-11 ENCOUNTER — APPOINTMENT (OUTPATIENT)
Dept: ORTHOPEDIC SURGERY | Facility: CLINIC | Age: 75
End: 2020-02-11

## 2020-02-24 ENCOUNTER — APPOINTMENT (OUTPATIENT)
Dept: RHEUMATOLOGY | Facility: CLINIC | Age: 75
End: 2020-02-24

## 2020-02-26 ENCOUNTER — APPOINTMENT (OUTPATIENT)
Dept: RHEUMATOLOGY | Facility: CLINIC | Age: 75
End: 2020-02-26
Payer: MEDICARE

## 2020-02-26 VITALS
DIASTOLIC BLOOD PRESSURE: 78 MMHG | HEART RATE: 84 BPM | OXYGEN SATURATION: 97 % | WEIGHT: 98 LBS | SYSTOLIC BLOOD PRESSURE: 122 MMHG | RESPIRATION RATE: 14 BRPM | BODY MASS INDEX: 18.5 KG/M2 | HEIGHT: 61 IN

## 2020-02-26 DIAGNOSIS — M06.9 RHEUMATOID ARTHRITIS, UNSPECIFIED: ICD-10-CM

## 2020-02-26 DIAGNOSIS — M70.71 OTHER BURSITIS OF HIP, RIGHT HIP: ICD-10-CM

## 2020-02-26 DIAGNOSIS — M25.669 STIFFNESS OF UNSPECIFIED KNEE, NOT ELSEWHERE CLASSIFIED: ICD-10-CM

## 2020-02-26 DIAGNOSIS — M25.559 PAIN IN UNSPECIFIED HIP: ICD-10-CM

## 2020-02-26 DIAGNOSIS — M19.012 PRIMARY OSTEOARTHRITIS, LEFT SHOULDER: ICD-10-CM

## 2020-02-26 DIAGNOSIS — M25.569 PAIN IN UNSPECIFIED KNEE: ICD-10-CM

## 2020-02-26 DIAGNOSIS — M25.551 PAIN IN RIGHT HIP: ICD-10-CM

## 2020-02-26 DIAGNOSIS — T88.7XXA UNSPECIFIED ADVERSE EFFECT OF DRUG OR MEDICAMENT, INITIAL ENCOUNTER: ICD-10-CM

## 2020-02-26 DIAGNOSIS — M54.16 RADICULOPATHY, LUMBAR REGION: ICD-10-CM

## 2020-02-26 DIAGNOSIS — K12.1 OTHER FORMS OF STOMATITIS: ICD-10-CM

## 2020-02-26 DIAGNOSIS — Z87.440 PERSONAL HISTORY OF URINARY (TRACT) INFECTIONS: ICD-10-CM

## 2020-02-26 DIAGNOSIS — Z87.898 PERSONAL HISTORY OF OTHER SPECIFIED CONDITIONS: ICD-10-CM

## 2020-02-26 PROCEDURE — 99214 OFFICE O/P EST MOD 30 MIN: CPT

## 2020-02-27 PROBLEM — M19.012 ARTHRITIS OF LEFT SHOULDER REGION: Status: RESOLVED | Noted: 2019-04-01 | Resolved: 2020-02-27

## 2020-02-27 PROBLEM — Z87.898 HISTORY OF DYSURIA: Status: RESOLVED | Noted: 2018-04-18 | Resolved: 2020-02-27

## 2020-02-27 PROBLEM — T88.7XXA MEDICATION SIDE EFFECT: Status: RESOLVED | Noted: 2019-11-19 | Resolved: 2020-02-27

## 2020-02-27 PROBLEM — K12.1 ORAL ULCER: Status: RESOLVED | Noted: 2019-11-19 | Resolved: 2020-02-27

## 2020-02-27 PROBLEM — M70.71 BURSITIS OF HIP, RIGHT: Status: RESOLVED | Noted: 2017-08-09 | Resolved: 2020-02-27

## 2020-02-27 PROBLEM — Z87.440 HISTORY OF URINARY TRACT INFECTION: Status: RESOLVED | Noted: 2018-04-23 | Resolved: 2020-02-27

## 2020-02-27 PROBLEM — M06.9 RHEUMATOID ARTHRITIS OF ELBOW: Status: RESOLVED | Noted: 2017-12-11 | Resolved: 2020-02-27

## 2020-02-27 NOTE — ASSESSMENT
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 \par \par #RA.   Erosive end-state \par - improvement in pain with increased mtx though not adherent to the regimen for unclear reasons\par - joints cool and not inflamed today - ? burnt out\par - continue orencia and methotrexate for now \par - will enter labs for prior to next visit - as this time - this works better for patient\par \par #chronic pain.  multifactorial\par - meloxicam didn’t help as much as alieve - dc meloxicam\par - ? cymbalta\par \par #GERD.  in setting of HH\par - paitent feels the folate is resulting in this\par - however GERD is chronic and has tried many medications through the years\par - advised going back to the GI doc - perhaps an EGD\par - patient can try to limit the folic acid and reassess the symptoms\par \par #Anxiety. \par - declining paxel from PCP\par - discussed r/b/a of cymbalta - as this may help both anxiety and chronic pain - r/b/a discussed - will consider\par \par #OP\par - on boniva - refill\par \par #hyponatremia\par - has been drinking the pedialyte - but not consistently -\par - will restart the pedialyte\par - f/u with nephro \par \par #medical monitoring - with side effect / v58.69\par -oral ulcer likely 2/2 mtx dose increase - increase folic acid to 2 tabs daily\par - labs reviewed with patient from earlier in the week\par - Quant Negative - May 2019 \par \par #Barriers to care.\par -  is 74 and taking care of her alone - they are moving to [facility] next month to have more help with doctors appointments and care\par - patient herself is a bit anxious about it and not looking forward to it\par - will see her in [facility] instead\par

## 2020-02-27 NOTE — HISTORY OF PRESENT ILLNESS
[None] : The patient is currently asymptomatic [FreeTextEntry1] : INTERVAL HX\par - no change in arthritis symptoms \par - feels that reflux is worse when taking the folic acid.\par - has a HH and  feels this is worsening symptoms as well\par - saw GI - who recommended colonoscopy - patient declined and they havent been back\par - increased the mtx to 6 tabs weekly - there has been a noticeable improvement in the pain in the joints.    states that he no longer has to rub creams onto the joints to ease the discomfort.  however patient states that sometimes takes only 5 tabs a week - doesn’t know why she does it that way sometimes.\par - did develop mouth sores however - didn’t have them in the past.   went to an oral surgeon who gave a wash - which she didn’t like and cannot tolerate.  currently taking 1 tab of folate but not any wash.\par - no ams\par - has gotten used to the shoes now.\par - in terms of the hyponatremia - has still not gone to neprho - but taking pediatlyte intermittently .  feels okay without dizziness.  no swelling - not drinking excessivley anymore

## 2020-02-27 NOTE — PHYSICAL EXAM
[General Appearance - Alert] : alert [General Appearance - In No Acute Distress] : in no acute distress [Sclera] : the sclera and conjunctiva were normal [Outer Ear] : the ears and nose were normal in appearance [Extraocular Movements] : extraocular movements were intact [PERRL With Normal Accommodation] : pupils were equal in size, round, and reactive to light [Neck Appearance] : the appearance of the neck was normal [Oropharynx] : the oropharynx was normal [Neck Cervical Mass (___cm)] : no neck mass was observed [Jugular Venous Distention Increased] : there was no jugular-venous distention [Thyroid Diffuse Enlargement] : the thyroid was not enlarged [Thyroid Nodule] : there were no palpable thyroid nodules [Auscultation Breath Sounds / Voice Sounds] : lungs were clear to auscultation bilaterally [Heart Rate And Rhythm] : heart rate was normal and rhythm regular [Heart Sounds] : normal S1 and S2 [Heart Sounds Gallop] : no gallops [Murmurs] : no murmurs [Edema] : there was no peripheral edema [Heart Sounds Pericardial Friction Rub] : no pericardial rub [No CVA Tenderness] : no ~M costovertebral angle tenderness [Skin Turgor] : normal skin turgor [Skin Color & Pigmentation] : normal skin color and pigmentation [] : no rash [Deep Tendon Reflexes (DTR)] : deep tendon reflexes were 2+ and symmetric [No Focal Deficits] : no focal deficits [Sensation] : the sensory exam was normal to light touch and pinprick [Oriented To Time, Place, And Person] : oriented to person, place, and time [Impaired Insight] : insight and judgment were intact [Mood] : the mood was normal [Affect] : the affect was normal [FreeTextEntry1] : wheelchair - gait with decrased flexion at hip and knees

## 2020-02-27 NOTE — CONSULT LETTER
[Dear  ___] : Dear  [unfilled], [Consult Letter:] : I had the pleasure of evaluating your patient, [unfilled]. [Please see my note below.] : Please see my note below. [Sincerely,] : Sincerely, [Consult Closing:] : Thank you very much for allowing me to participate in the care of this patient.  If you have any questions, please do not hesitate to contact me. [DrElisha  ___] : Dr. HERNÁNDEZ [FreeTextEntry2] : Krysten Ng\par Pa Ramos

## 2020-04-09 PROBLEM — M25.559 HIP PAIN: Status: RESOLVED | Noted: 2017-05-31 | Resolved: 2020-04-09

## 2020-06-15 ENCOUNTER — APPOINTMENT (OUTPATIENT)
Dept: RHEUMATOLOGY | Facility: CLINIC | Age: 75
End: 2020-06-15

## 2020-07-14 LAB
ALBUMIN SERPL ELPH-MCNC: 4.1 G/DL
ALP BLD-CCNC: 55 U/L
ALT SERPL-CCNC: 11 U/L
ANION GAP SERPL CALC-SCNC: 14 MMOL/L
AST SERPL-CCNC: 22 U/L
BASOPHILS # BLD AUTO: 0.05 K/UL
BASOPHILS NFR BLD AUTO: 0.5 %
BILIRUB SERPL-MCNC: 0.4 MG/DL
BUN SERPL-MCNC: 13 MG/DL
CALCIUM SERPL-MCNC: 9.4 MG/DL
CHLORIDE SERPL-SCNC: 99 MMOL/L
CO2 SERPL-SCNC: 22 MMOL/L
CREAT SERPL-MCNC: 0.72 MG/DL
CRP SERPL-MCNC: 0.2 MG/DL
EOSINOPHIL # BLD AUTO: 0.13 K/UL
EOSINOPHIL NFR BLD AUTO: 1.3 %
ERYTHROCYTE [SEDIMENTATION RATE] IN BLOOD BY WESTERGREN METHOD: 90 MM/HR
HCT VFR BLD CALC: 39.2 %
HGB BLD-MCNC: 12.9 G/DL
IMM GRANULOCYTES NFR BLD AUTO: 0.4 %
LYMPHOCYTES # BLD AUTO: 1.88 K/UL
LYMPHOCYTES NFR BLD AUTO: 18.5 %
MAN DIFF?: NORMAL
MCHC RBC-ENTMCNC: 31.3 PG
MCHC RBC-ENTMCNC: 32.9 GM/DL
MCV RBC AUTO: 95.1 FL
MONOCYTES # BLD AUTO: 0.44 K/UL
MONOCYTES NFR BLD AUTO: 4.3 %
NEUTROPHILS # BLD AUTO: 7.6 K/UL
NEUTROPHILS NFR BLD AUTO: 75 %
PLATELET # BLD AUTO: 212 K/UL
POTASSIUM SERPL-SCNC: 4 MMOL/L
PROT SERPL-MCNC: 7.1 G/DL
RBC # BLD: 4.12 M/UL
RBC # FLD: 14.3 %
SODIUM SERPL-SCNC: 135 MMOL/L
WBC # FLD AUTO: 10.14 K/UL

## 2020-07-15 ENCOUNTER — APPOINTMENT (OUTPATIENT)
Dept: RHEUMATOLOGY | Facility: CLINIC | Age: 75
End: 2020-07-15
Payer: MEDICARE

## 2020-07-15 PROCEDURE — 99442: CPT

## 2020-07-15 RX ORDER — DULOXETINE HYDROCHLORIDE 20 MG/1
20 CAPSULE, DELAYED RELEASE PELLETS ORAL
Qty: 30 | Refills: 5 | Status: DISCONTINUED | COMMUNITY
Start: 2019-11-19 | End: 2020-07-15

## 2020-09-02 ENCOUNTER — APPOINTMENT (OUTPATIENT)
Dept: RHEUMATOLOGY | Facility: CLINIC | Age: 75
End: 2020-09-02
Payer: MEDICARE

## 2020-09-02 PROCEDURE — 99215 OFFICE O/P EST HI 40 MIN: CPT | Mod: 95

## 2020-09-02 NOTE — HISTORY OF PRESENT ILLNESS
[Home] : at home, [unfilled] , at the time of the visit. [Other Location: e.g. Home (Enter Location, City,State)___] : at [unfilled] [FreeTextEntry1] : INTERVAL HX\par requested urgent visit for foot\par - skin on foot tore - now looks like it is getting infected.  went to pcp earlier in the week, but he wasn’t there.  NP said she had to wait for PCP.  over the last few days it has gotten more red and nowonthe bandage some green pus.  denies fever or chills. \par - ra okay - methotrexate gives gi upset\par - no change in arthritis symptoms \par - the ankle and shoulders swollen.  now there is a blister on theankle.  developed a blister on the ankle.  it got tore the skin - PCP saw it but said didn’t need abx. no fevers, chills.  yesterday looks like there was pus on the bandage.  no allergy to abx.  \par - feels that reflux is worse when taking the folic acid.\par - the other joints are about the same.\par - has a HH and  feels this is worsening symptoms as well\par - saw GI - who recommended colonoscopy - patient declined and they havent been back\par - increased the mtx to 6 tabs weekly - there has been a noticeable improvement in the pain in the joints.    states that he no longer has to rub creams onto the joints to ease the discomfort.  however patient states that sometimes takes only 5 tabs a week - doesn’t know why she does it that way sometimes.\par - did develop mouth sores however - didn’t have them in the past.   went to an oral surgeon who gave a wash - which she didn’t like and cannot tolerate.  currently taking 1 tab of folate but not any wash.\par - no ams\par - has gotten used to the shoes now.\par - in terms of the hyponatremia - has still not gone to neprho - but taking pediatlyte intermittently .  feels okay without dizziness.  no swelling - not drinking excessivley anymore [Verbal consent obtained from patient] : the patient, [unfilled] [None] : The patient is currently asymptomatic

## 2020-09-02 NOTE — ASSESSMENT
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 \par \par TEB September 9 at 130\par \par #celluliits.  \par - now with some pus on bandage - worsening over the last few days \par - denies f/c\par - denies any reaction to abx- allergy in chart says terri but patient and  says this is an error \par - has been on Keflex before - \par - start Keflex x 10 days - if f/c/ns go to ER\par - r/b/a discussed at length\par - hold MTX, Orencia x 1 week\par \par #RA.   Erosive end-state \par - improvement in pain with increased mtx though not adherent to the regimen for unclear reasons\par - joints cool and not inflamed today - ? burnt out\par - continue orencia and methotrexate for now \par \par #chronic pain.  multifactorial\par - meloxicam didn’t help as much as alieve - dc meloxicam\par - ? cymbalta\par \par #GERD.  in setting of HH\par - paitent feels the folate is resulting in this\par - however GERD is chronic and has tried many medications through the years\par - advised going back to the GI doc - perhaps an EGD\par - patient can try to limit the folic acid and reassess the symptoms\par \par #Anxiety. \par - declining paxel from PCP\par - discussed r/b/a of cymbalta - as this may help both anxiety and chronic pain - r/b/a discussed - will consider\par \par #OP\par - on boniva - refill\par \par #hyponatremia\par - has been drinking the pedialyte - but not consistently -\par - will restart the pedialyte\par - f/u with nephro \par \par #medical monitoring - with side effect / v58.69\par -oral ulcer likely 2/2 mtx dose increase - increase folic acid to 2 tabs daily\par - labs reviewed with patient from earlier in the week\par - Quant Negative - May 2019 \par \par #social determinants\par -  juanita now has a heart condition which has been difficult to continue to care for her \par - moved to new apartment which is going well\par #Barriers to care.\par -  is 74 and taking care of her alone - they are moving to [facility] next month to have more help with doctors appointments and care\par - patient herself is a bit anxious about it and not looking forward to it\par - will see her in great neck office instead\par

## 2020-09-02 NOTE — PHYSICAL EXAM
[General Appearance - Alert] : alert [General Appearance - In No Acute Distress] : in no acute distress [Sclera] : the sclera and conjunctiva were normal [PERRL With Normal Accommodation] : pupils were equal in size, round, and reactive to light [Extraocular Movements] : extraocular movements were intact [Outer Ear] : the ears and nose were normal in appearance [Oropharynx] : the oropharynx was normal [Neck Cervical Mass (___cm)] : no neck mass was observed [Neck Appearance] : the appearance of the neck was normal [Thyroid Diffuse Enlargement] : the thyroid was not enlarged [Jugular Venous Distention Increased] : there was no jugular-venous distention [Thyroid Nodule] : there were no palpable thyroid nodules [Auscultation Breath Sounds / Voice Sounds] : lungs were clear to auscultation bilaterally [Heart Rate And Rhythm] : heart rate was normal and rhythm regular [Heart Sounds Gallop] : no gallops [Murmurs] : no murmurs [Heart Sounds] : normal S1 and S2 [Heart Sounds Pericardial Friction Rub] : no pericardial rub [Skin Color & Pigmentation] : normal skin color and pigmentation [Skin Turgor] : normal skin turgor [] : no rash [Deep Tendon Reflexes (DTR)] : deep tendon reflexes were 2+ and symmetric [Sensation] : the sensory exam was normal to light touch and pinprick [No Focal Deficits] : no focal deficits [Affect] : the affect was normal [Oriented To Time, Place, And Person] : oriented to person, place, and time [Impaired Insight] : insight and judgment were intact [FreeTextEntry1] : anxious [Mood] : the mood was normal

## 2020-09-02 NOTE — CONSULT LETTER
[Dear  ___] : Dear  [unfilled], [Consult Letter:] : I had the pleasure of evaluating your patient, [unfilled]. [Consult Closing:] : Thank you very much for allowing me to participate in the care of this patient.  If you have any questions, please do not hesitate to contact me. [Please see my note below.] : Please see my note below. [FreeTextEntry2] : Krysten Ng\par Pa Ramos [Sincerely,] : Sincerely, [DrElisha  ___] : Dr. HERNÁNDEZ

## 2020-09-09 ENCOUNTER — NON-APPOINTMENT (OUTPATIENT)
Age: 75
End: 2020-09-09

## 2020-09-09 ENCOUNTER — APPOINTMENT (OUTPATIENT)
Dept: RHEUMATOLOGY | Facility: CLINIC | Age: 75
End: 2020-09-09
Payer: MEDICARE

## 2020-09-09 PROCEDURE — 99213 OFFICE O/P EST LOW 20 MIN: CPT | Mod: 95

## 2020-09-09 NOTE — ASSESSMENT
[FreeTextEntry1] : 72 yo with OP, Anxiety, chronic pain and severe deforming RA now with subluxation of multiple joints and s/p bilateral knee and hip replacements. \par \par #celluliits.  \par - now with some pus on bandage - worsening over the last few days \par - denies f/c\par - denies any reaction to abx- allergy in chart says terri but patient and  says this is an error \par - decided to not take the keflex and continue the arthritis meds- still no fever\par - better - but taking a long time\par - hold MTX, Orencia x 1 week r/b/a discussed with patient and  about infection and immunosuppression\par \par #RA.   Erosive end-state \par - improvement in pain with increased mtx though not adherent to the regimen for unclear reasons\par - joints cool and not inflamed today - ? burnt out\par - continue orencia and methotrexate for now \par \par #chronic pain.  multifactorial\par - meloxicam didn’t help as much as alieve - dc meloxicam\par - ? cymbalta\par \par #GERD.  in setting of HH\par - paitent feels the folate is resulting in this\par - however GERD is chronic and has tried many medications through the years\par - advised going back to the GI doc - perhaps an EGD\par - patient can try to limit the folic acid and reassess the symptoms\par \par #Anxiety. \par - declining paxel from PCP\par - discussed r/b/a of cymbalta - as this may help both anxiety and chronic pain - r/b/a discussed - will consider\par \par #OP\par - on boniva - refill\par \par #hyponatremia\par - has been drinking the pedialyte - but not consistently -\par - will restart the pedialyte\par - f/u with nephro \par \par #medical monitoring - with side effect / v58.69\par -oral ulcer likely 2/2 mtx dose increase - increase folic acid to 2 tabs daily\par - labs reviewed with patient from earlier in the week\par - Quant Negative - May 2019 \par \par #social determinants\par -  juanita now has a heart condition which has been difficult to continue to care for her \par - moved to new apartment which is going well\par #Barriers to care.\par -  is 74 and taking care of her alone - they are moving to [facility] next month to have more help with doctors appointments and care\par - patient herself is a bit anxious about it and not looking forward to it\par - will see her in great neck office instead\par

## 2020-09-09 NOTE — CONSULT LETTER
[Dear  ___] : Dear  [unfilled], [Please see my note below.] : Please see my note below. [Consult Letter:] : I had the pleasure of evaluating your patient, [unfilled]. [Sincerely,] : Sincerely, [Consult Closing:] : Thank you very much for allowing me to participate in the care of this patient.  If you have any questions, please do not hesitate to contact me. [DrElisha  ___] : Dr. HERNÁNDEZ [FreeTextEntry2] : Krysten Ng\par aP Ramos

## 2020-09-09 NOTE — HISTORY OF PRESENT ILLNESS
[Home] : at home, [unfilled] , at the time of the visit. [Other Location: e.g. Home (Enter Location, City,State)___] : at [unfilled] [None] : The patient is currently asymptomatic [Verbal consent obtained from patient] : the patient, [unfilled] [FreeTextEntry1] : TEB via FT\par AW attempted via text and email\par \par 73 year old with erosive RA for years here to transfer care. \par \par # RA - developed at age 25.  Was severe at the beginning with inflammation.  Initially was treated in [place] with years of steroids and antiinflammatory only.  [place] the [place] in [place].  Remembers being on Enbrel, Humira and most recently methotrexate and Orencia (SQ).    Has had both knees and hip replaced at this point.  mother had RA as well.   Currently denies inflammation - there is no morning stiffness and her joints don’t get swollen or red any longer.  She has little pain in her joints except for those that have dislocated and subluxed.  Her biggest limitation and source of pain currently is her elbows (both) and her left shoulder.  She also gets low back pain.  all these symptoms are chronic for at least a year.\par \par #joint replacement hx\par -1996 bkr in [facility] \par -2003 right hip replacement but wasn’t able to get exact size but a larger hip was used -\par -2007 replaced left\par -2010 right hip revision with a femur\par -2014 right knee revision (nagi renee)\par \par #OP - unsure of last bone density - has been on monthly boniva.  \par

## 2020-09-09 NOTE — PHYSICAL EXAM
[General Appearance - Alert] : alert [General Appearance - In No Acute Distress] : in no acute distress [Sclera] : the sclera and conjunctiva were normal [Extraocular Movements] : extraocular movements were intact [PERRL With Normal Accommodation] : pupils were equal in size, round, and reactive to light [Oropharynx] : the oropharynx was normal [Outer Ear] : the ears and nose were normal in appearance [Neck Appearance] : the appearance of the neck was normal [Neck Cervical Mass (___cm)] : no neck mass was observed [Jugular Venous Distention Increased] : there was no jugular-venous distention [Thyroid Nodule] : there were no palpable thyroid nodules [Thyroid Diffuse Enlargement] : the thyroid was not enlarged [Auscultation Breath Sounds / Voice Sounds] : lungs were clear to auscultation bilaterally [Heart Sounds] : normal S1 and S2 [Heart Rate And Rhythm] : heart rate was normal and rhythm regular [Heart Sounds Gallop] : no gallops [Murmurs] : no murmurs [Heart Sounds Pericardial Friction Rub] : no pericardial rub [Skin Turgor] : normal skin turgor [Skin Color & Pigmentation] : normal skin color and pigmentation [] : no rash [Deep Tendon Reflexes (DTR)] : deep tendon reflexes were 2+ and symmetric [Sensation] : the sensory exam was normal to light touch and pinprick [No Focal Deficits] : no focal deficits [Oriented To Time, Place, And Person] : oriented to person, place, and time [Impaired Insight] : insight and judgment were intact [Mood] : the mood was normal [Affect] : the affect was normal [FreeTextEntry1] : anxious

## 2020-10-07 ENCOUNTER — RX RENEWAL (OUTPATIENT)
Age: 75
End: 2020-10-07

## 2020-10-19 LAB
25(OH)D3 SERPL-MCNC: 48.4 NG/ML
ALBUMIN SERPL ELPH-MCNC: 4.1 G/DL
ALP BLD-CCNC: 54 U/L
ALT SERPL-CCNC: 11 U/L
ANION GAP SERPL CALC-SCNC: 13 MMOL/L
AST SERPL-CCNC: 23 U/L
BASOPHILS # BLD AUTO: 0.04 K/UL
BASOPHILS NFR BLD AUTO: 0.5 %
BILIRUB SERPL-MCNC: 0.5 MG/DL
BUN SERPL-MCNC: 9 MG/DL
CALCIUM SERPL-MCNC: 9.2 MG/DL
CHLORIDE SERPL-SCNC: 98 MMOL/L
CO2 SERPL-SCNC: 23 MMOL/L
CREAT SERPL-MCNC: 0.69 MG/DL
CRP SERPL-MCNC: 0.25 MG/DL
EOSINOPHIL # BLD AUTO: 0.22 K/UL
EOSINOPHIL NFR BLD AUTO: 2.8 %
ERYTHROCYTE [SEDIMENTATION RATE] IN BLOOD BY WESTERGREN METHOD: 69 MM/HR
HCT VFR BLD CALC: 40.1 %
HGB BLD-MCNC: 12.9 G/DL
IMM GRANULOCYTES NFR BLD AUTO: 0.3 %
LYMPHOCYTES # BLD AUTO: 2.06 K/UL
LYMPHOCYTES NFR BLD AUTO: 25.9 %
M TB IFN-G BLD-IMP: NEGATIVE
MAN DIFF?: NORMAL
MCHC RBC-ENTMCNC: 31.3 PG
MCHC RBC-ENTMCNC: 32.2 GM/DL
MCV RBC AUTO: 97.3 FL
MONOCYTES # BLD AUTO: 0.52 K/UL
MONOCYTES NFR BLD AUTO: 6.5 %
NEUTROPHILS # BLD AUTO: 5.09 K/UL
NEUTROPHILS NFR BLD AUTO: 64 %
PLATELET # BLD AUTO: 215 K/UL
POTASSIUM SERPL-SCNC: 4 MMOL/L
PROT SERPL-MCNC: 7.1 G/DL
QUANTIFERON TB PLUS MITOGEN MINUS NIL: 8.19 IU/ML
QUANTIFERON TB PLUS NIL: 0.03 IU/ML
QUANTIFERON TB PLUS TB1 MINUS NIL: 0.01 IU/ML
QUANTIFERON TB PLUS TB2 MINUS NIL: 0 IU/ML
RBC # BLD: 4.12 M/UL
RBC # FLD: 14 %
SODIUM SERPL-SCNC: 133 MMOL/L
WBC # FLD AUTO: 7.95 K/UL

## 2020-11-23 ENCOUNTER — APPOINTMENT (OUTPATIENT)
Dept: RHEUMATOLOGY | Facility: CLINIC | Age: 75
End: 2020-11-23
Payer: MEDICARE

## 2020-11-23 PROCEDURE — 99214 OFFICE O/P EST MOD 30 MIN: CPT | Mod: 95

## 2020-11-23 NOTE — HISTORY OF PRESENT ILLNESS
[Home] : at home, [unfilled] , at the time of the visit. [Other Location: e.g. Home (Enter Location, City,State)___] : at [unfilled] [Verbal consent obtained from patient] : the patient, [unfilled] [None] : The patient is currently asymptomatic [FreeTextEntry1] : INTERVAL HX\par - feels good in general\par - some increased achiness with weather changes and the stormy weather \par - denies swelling though in the joints and no constitutional symptoms \par - is concerned about covid and leaving house for blood draw

## 2020-11-23 NOTE — ASSESSMENT
[FreeTextEntry1] : 72 yo with OP, Anxiety, chronic pain and severe deforming RA now with subluxation of multiple joints and s/p bilateral knee and hip replacements. \par \par would like to have blood draw at home - doesn’t want to leave houseduring pandemic\par \par #RA.   Erosive end-state \par - improvement in pain with increased mtx though not adherent to the regimen for unclear reasons\par - joints cool and not inflamed today - ? burnt out\par - continue orencia and methotrexate for now \par \par #weight loss \par - about 5 pound weight loss - not eating that much. feels lthat because not moving is not that hungry \par - encouraged increased po intake \par - encourage ensure or other supplement and d/w primary\par #chronic pain.  multifactorial\par - meloxicam didn’t help as much as alieve - dc meloxicam\par - ? cymbalta\par \par #GERD.  in setting of HH\par - paitent feels the folate is resulting in this\par - however GERD is chronic and has tried many medications through the years\par - advised going back to the GI doc - perhaps an EGD\par - patient can try to limit the folic acid and reassess the symptoms\par \par #Anxiety. \par - declining paxel from PCP\par - discussed r/b/a of cymbalta - as this may help both anxiety and chronic pain - r/b/a discussed - will consider\par \par #OP\par - on boniva - refill\par \par #hyponatremia\par - has been drinking the pedialyte - but not consistently -\par - will restart the pedialyte\par - f/u with nephro \par \par #medical monitoring - with side effect / v58.69\par -oral ulcer likely 2/2 mtx dose increase - increase folic acid to 2 tabs daily\par - labs reviewed with patient from earlier in the week\par - Quant Negative - May 2019 \par \par #social determinants\par -  juanita now has a heart condition which has been difficult to continue to care for her \par - moved to new apartment which is going well\par \par #Barriers to care.\par -  is 74 and taking care of her alone - they are moving to [facility] next month to have more help with doctors appointments and care\par - patient herself is a bit anxious about it and not looking forward to it\par - will see her in great neck office instead - declines until after winter\par

## 2020-11-23 NOTE — PHYSICAL EXAM
[General Appearance - Alert] : alert [General Appearance - In No Acute Distress] : in no acute distress [Oriented To Time, Place, And Person] : oriented to person, place, and time [Impaired Insight] : insight and judgment were intact [Affect] : the affect was normal [Mood] : the mood was normal [FreeTextEntry1] : anxious

## 2021-02-01 ENCOUNTER — RX RENEWAL (OUTPATIENT)
Age: 76
End: 2021-02-01

## 2021-03-12 ENCOUNTER — NON-APPOINTMENT (OUTPATIENT)
Age: 76
End: 2021-03-12

## 2021-03-29 ENCOUNTER — APPOINTMENT (OUTPATIENT)
Dept: RHEUMATOLOGY | Facility: CLINIC | Age: 76
End: 2021-03-29

## 2021-04-29 LAB
25(OH)D3 SERPL-MCNC: 61.1 NG/ML
ALBUMIN SERPL ELPH-MCNC: 4.3 G/DL
ALP BLD-CCNC: 70 U/L
ALT SERPL-CCNC: 17 U/L
ANION GAP SERPL CALC-SCNC: 12 MMOL/L
AST SERPL-CCNC: 29 U/L
BASOPHILS # BLD AUTO: 0.04 K/UL
BASOPHILS NFR BLD AUTO: 0.4 %
BILIRUB SERPL-MCNC: 0.4 MG/DL
BUN SERPL-MCNC: 13 MG/DL
CALCIUM SERPL-MCNC: 10.1 MG/DL
CHLORIDE SERPL-SCNC: 97 MMOL/L
CO2 SERPL-SCNC: 25 MMOL/L
CREAT SERPL-MCNC: 0.7 MG/DL
CRP SERPL-MCNC: 3 MG/L
EOSINOPHIL # BLD AUTO: 0.22 K/UL
EOSINOPHIL NFR BLD AUTO: 2.2 %
ERYTHROCYTE [SEDIMENTATION RATE] IN BLOOD BY WESTERGREN METHOD: 67 MM/HR
HCT VFR BLD CALC: 43.1 %
HGB BLD-MCNC: 13.9 G/DL
IMM GRANULOCYTES NFR BLD AUTO: 0.2 %
LYMPHOCYTES # BLD AUTO: 2.15 K/UL
LYMPHOCYTES NFR BLD AUTO: 21.8 %
MAN DIFF?: NORMAL
MCHC RBC-ENTMCNC: 31.3 PG
MCHC RBC-ENTMCNC: 32.3 GM/DL
MCV RBC AUTO: 97.1 FL
MONOCYTES # BLD AUTO: 0.7 K/UL
MONOCYTES NFR BLD AUTO: 7.1 %
NEUTROPHILS # BLD AUTO: 6.75 K/UL
NEUTROPHILS NFR BLD AUTO: 68.3 %
PLATELET # BLD AUTO: 232 K/UL
POTASSIUM SERPL-SCNC: 4.7 MMOL/L
PROT SERPL-MCNC: 7.3 G/DL
RBC # BLD: 4.44 M/UL
RBC # FLD: 14.4 %
SODIUM SERPL-SCNC: 134 MMOL/L
WBC # FLD AUTO: 9.88 K/UL

## 2021-06-24 ENCOUNTER — APPOINTMENT (OUTPATIENT)
Dept: RHEUMATOLOGY | Facility: CLINIC | Age: 76
End: 2021-06-24
Payer: MEDICARE

## 2021-06-24 PROCEDURE — 99214 OFFICE O/P EST MOD 30 MIN: CPT | Mod: 95

## 2021-06-24 NOTE — CONSULT LETTER
[Dear  ___] : Dear  [unfilled], [Courtesy Letter:] : I had the pleasure of seeing your patient, [unfilled], in my office today. [Please see my note below.] : Please see my note below. [Consult Closing:] : Thank you very much for allowing me to participate in the care of this patient.  If you have any questions, please do not hesitate to contact me. [Sincerely,] : Sincerely, [FreeTextEntry2] : Krysten Ng\par Pa Ramos [DrElisha  ___] : Dr. HERNÁNDEZ

## 2021-06-24 NOTE — ASSESSMENT
[FreeTextEntry1] : 72 yo with OP, Anxiety, chronic pain and severe deforming RA now with subluxation of multiple joints and s/p bilateral knee and hip replacements. \par \par would like to have blood draw at home - doesn’t want to leave houseduring pandemic\par \par #neck pain \par - actue - no paraesthesias -but some head pain \par - r/o c1/c2 subluxation\par \par #RA.   Erosive end-state \par - improvement in pain with increased mtx though not adherent to the regimen for unclear reasons\par - joints cool and not inflamed today - ? burnt out\par - continue orencia and methotrexate for now \par - MDP x1 for flare\par \par #weight loss \par - about 5 pound weight loss - not eating that much. feels lthat because not moving is not that hungry \par - encouraged increased po intake \par - encourage ensure or other supplement and d/w primary\par #chronic pain.  multifactorial\par - meloxicam didn’t help as much as tushar - dc meloxicam\par - ? cymbalta\par \par #GERD.  in setting of HH\par - paitent feels the folate is resulting in this\par - however GERD is chronic and has tried many medications through the years\par - advised going back to the GI doc - perhaps an EGD\par - patient can try to limit the folic acid and reassess the symptoms\par \par #Anxiety. \par - declining paxel from PCP\par - discussed r/b/a of cymbalta - as this may help both anxiety and chronic pain - r/b/a discussed - will consider\par \par #OP\par - on boniva - refill\par \par #hyponatremia\par - has been drinking the pedialyte - but not consistently -\par - will restart the pedialyte\par - f/u with nephro \par \par #medical monitoring - with side effect / v58.69\par -oral ulcer likely 2/2 mtx dose increase - increase folic acid to 2 tabs daily\par - labs reviewed with patient from earlier in the week\par - Quant Negative - May 2019 \par \par #social determinants\par -  juanita now has a heart condition which has been difficult to continue to care for her \par - moved to new apartment which is going well\par \par #Barriers to care.\par -  is 74 and taking care of her alone - they are moving to [facility] next month to have more help with doctors appointments and care\par - patient herself is a bit anxious about it and not looking forward to it\par - will see her in great neck office instead - declines until after winter\par

## 2021-06-24 NOTE — HISTORY OF PRESENT ILLNESS
[FreeTextEntry1] : \par INTERVAL HX \par - the elbow is paining\par - the lower back and the ankle is troublesome - \par - - neck started to give trouble - feels burning in the head\par - the methotrexate is \par - the right elbow is in consistent pain as is the left ankle\par - can still walk on her ankles\par - losing weight\par - no tinglig in the fingers - but head feels heavy - no bowel or bladder incontinence -no parashtesias\par - stoppedal exercise\par - going to gi for GERD. was given omeprazole but this didn’t help much.  will be going back to ask them other options.raw [Home] : at home, [unfilled] , at the time of the visit. [Other Location: e.g. Home (Enter Location, City,State)___] : at [unfilled] [Verbal consent obtained from patient] : the patient, [unfilled] [None] : The patient is currently asymptomatic

## 2021-06-25 ENCOUNTER — APPOINTMENT (OUTPATIENT)
Dept: RADIOLOGY | Facility: IMAGING CENTER | Age: 76
End: 2021-06-25

## 2021-06-25 ENCOUNTER — NON-APPOINTMENT (OUTPATIENT)
Age: 76
End: 2021-06-25

## 2021-06-29 ENCOUNTER — NON-APPOINTMENT (OUTPATIENT)
Age: 76
End: 2021-06-29

## 2021-06-29 ENCOUNTER — RESULT REVIEW (OUTPATIENT)
Age: 76
End: 2021-06-29

## 2021-06-30 ENCOUNTER — OUTPATIENT (OUTPATIENT)
Dept: OUTPATIENT SERVICES | Facility: HOSPITAL | Age: 76
LOS: 1 days | End: 2021-06-30
Payer: COMMERCIAL

## 2021-06-30 ENCOUNTER — APPOINTMENT (OUTPATIENT)
Dept: RADIOLOGY | Facility: IMAGING CENTER | Age: 76
End: 2021-06-30
Payer: MEDICARE

## 2021-06-30 DIAGNOSIS — Z84.89 FAMILY HISTORY OF OTHER SPECIFIED CONDITIONS: Chronic | ICD-10-CM

## 2021-06-30 DIAGNOSIS — Z00.8 ENCOUNTER FOR OTHER GENERAL EXAMINATION: ICD-10-CM

## 2021-06-30 DIAGNOSIS — M06.9 RHEUMATOID ARTHRITIS, UNSPECIFIED: ICD-10-CM

## 2021-06-30 LAB
ALBUMIN SERPL ELPH-MCNC: 4.3 G/DL
ALP BLD-CCNC: 56 U/L
ALT SERPL-CCNC: 10 U/L
ANION GAP SERPL CALC-SCNC: 11 MMOL/L
AST SERPL-CCNC: 16 U/L
BASOPHILS # BLD AUTO: 0.02 K/UL
BASOPHILS NFR BLD AUTO: 0.2 %
BILIRUB SERPL-MCNC: 0.4 MG/DL
BUN SERPL-MCNC: 19 MG/DL
CALCIUM SERPL-MCNC: 9.7 MG/DL
CHLORIDE SERPL-SCNC: 95 MMOL/L
CO2 SERPL-SCNC: 22 MMOL/L
CREAT SERPL-MCNC: 0.76 MG/DL
CRP SERPL-MCNC: <3 MG/L
EOSINOPHIL # BLD AUTO: 0.02 K/UL
EOSINOPHIL NFR BLD AUTO: 0.2 %
HCT VFR BLD CALC: 38.9 %
HGB BLD-MCNC: 12.6 G/DL
IMM GRANULOCYTES NFR BLD AUTO: 0.4 %
LYMPHOCYTES # BLD AUTO: 1.7 K/UL
LYMPHOCYTES NFR BLD AUTO: 14.3 %
MAN DIFF?: NORMAL
MCHC RBC-ENTMCNC: 32.4 GM/DL
MCHC RBC-ENTMCNC: 32.6 PG
MCV RBC AUTO: 100.5 FL
MONOCYTES # BLD AUTO: 0.94 K/UL
MONOCYTES NFR BLD AUTO: 7.9 %
NEUTROPHILS # BLD AUTO: 9.18 K/UL
NEUTROPHILS NFR BLD AUTO: 77 %
PLATELET # BLD AUTO: 225 K/UL
POTASSIUM SERPL-SCNC: 4.5 MMOL/L
PROT SERPL-MCNC: 7.2 G/DL
RBC # BLD: 3.87 M/UL
RBC # FLD: 15.1 %
SODIUM SERPL-SCNC: 128 MMOL/L
WBC # FLD AUTO: 11.91 K/UL

## 2021-06-30 PROCEDURE — 72040 X-RAY EXAM NECK SPINE 2-3 VW: CPT

## 2021-06-30 PROCEDURE — 72040 X-RAY EXAM NECK SPINE 2-3 VW: CPT | Mod: 26

## 2021-07-01 ENCOUNTER — APPOINTMENT (OUTPATIENT)
Dept: RHEUMATOLOGY | Facility: CLINIC | Age: 76
End: 2021-07-01
Payer: MEDICARE

## 2021-07-01 PROCEDURE — 99214 OFFICE O/P EST MOD 30 MIN: CPT | Mod: 95

## 2021-07-01 NOTE — ASSESSMENT
[FreeTextEntry1] : 72 yo with OP, Anxiety, chronic pain and severe deforming RA now with subluxation of multiple joints and s/p bilateral knee and hip replacements. \par \par #hyponatremia \par - going down again - ? related to head sensation \par - f/u with pcp\par - was taking pedialyte and now that stopped it the sodium is going down.   will need to restart it\par \par #neck pain \par - actue - no paraesthesias -but some head pain \par - r/o c1/c2 subluxation - instability at C4/5 \par - f/u with spine doctor\par - [place] - will go to spien\par \par #RA.   Erosive end-state \par - improvement in pain with increased mtx though not adherent to the regimen for unclear reasons\par - joints cool and not inflamed today - ? burnt out\par - continue orencia and methotrexate for now \par - MDP x1 for flare\par \par #weight loss \par - about 5 pound weight loss - not eating that much. feels lthat because not moving is not that hungry \par - encouraged increased po intake \par - encourage ensure or other supplement and d/w primary\par #chronic pain.  multifactorial\par - meloxicam didn’t help as much as tushar - dc meloxicam\par - ? cymbalta\par \par #GERD.  in setting of HH\par - paitent feels the folate is resulting in this\par - however GERD is chronic and has tried many medications through the years\par - advised going back to the GI doc - perhaps an EGD\par - patient can try to limit the folic acid and reassess the symptoms\par \par #Anxiety. \par - declining paxel from PCP\par - discussed r/b/a of cymbalta - as this may help both anxiety and chronic pain - r/b/a discussed - will consider\par \par #OP\par - on boniva - refill\par \par #hyponatremia\par - has been drinking the pedialyte - but not consistently -\par - will restart the pedialyte\par - f/u with nephro \par \par #medical monitoring - with side effect / v58.69\par -oral ulcer likely 2/2 mtx dose increase - increase folic acid to 2 tabs daily\par - labs reviewed with patient from earlier in the week\par - Quant Negative - May 2019 \par \par #social determinants\par -  juanita now has a heart condition which has been difficult to continue to care for her \par - moved to new apartment which is going well\par \par #Barriers to care.\par -  is 74 and taking care of her alone - they are moving to [facility] next month to have more help with doctors appointments and care\par - patient herself is a bit anxious about it and not looking forward to it\par - will see her in [facility] instead - declines until after winter\par

## 2021-07-01 NOTE — CONSULT LETTER
[Dear  ___] : Dear  [unfilled], [Courtesy Letter:] : I had the pleasure of seeing your patient, [unfilled], in my office today. [Please see my note below.] : Please see my note below. [Consult Closing:] : Thank you very much for allowing me to participate in the care of this patient.  If you have any questions, please do not hesitate to contact me. [Sincerely,] : Sincerely, [DrElisha  ___] : Dr. HERNÁNDEZ [FreeTextEntry2] : Krysten Ng\par Pa Ramos

## 2021-07-01 NOTE — HISTORY OF PRESENT ILLNESS
[Home] : at home, [unfilled] , at the time of the visit. [Other Location: e.g. Home (Enter Location, City,State)___] : at [unfilled] [Verbal consent obtained from patient] : the patient, [unfilled] [None] : The patient is currently asymptomatic [FreeTextEntry1] : INTERVAL HX \par - aches and pains have gone - \par - still feels a heaviness to the head - not the one that was before \par - neck is better\par - off the steroids - tomorrow will be finished

## 2021-07-07 ENCOUNTER — APPOINTMENT (OUTPATIENT)
Dept: ORTHOPEDIC SURGERY | Facility: CLINIC | Age: 76
End: 2021-07-07
Payer: MEDICARE

## 2021-07-07 VITALS
WEIGHT: 98 LBS | DIASTOLIC BLOOD PRESSURE: 76 MMHG | HEART RATE: 80 BPM | HEIGHT: 61 IN | BODY MASS INDEX: 18.5 KG/M2 | SYSTOLIC BLOOD PRESSURE: 118 MMHG

## 2021-07-07 PROCEDURE — 99214 OFFICE O/P EST MOD 30 MIN: CPT

## 2021-07-07 NOTE — HISTORY OF PRESENT ILLNESS
[de-identified] : Ms. JENNY DARDEN  is a 75 year old female who presents with 2 months of neck pain.  She also had bilateral hand and wrist pain.  Her rheumatologist gave her a medrol dose pack which has helped her symptoms.  Denies any LE radicular symptoms.  Normal bowel and bladder control.   Denies any recent fevers, chills, sweats, weight loss, or infection.  She has a long history of RA.  \par \par The patients past medical history, past surgical history, medications, allergies, and social history were reviewed by me today with the patient and documented accordingly.  In addition, the patient's family history, which is noncontributory to their visit, was also reviewed.\par

## 2021-07-07 NOTE — DISCUSSION/SUMMARY
[de-identified] : Given her evidence of myelopathy and rheumatoid arthritis I recommended a cervical spine MRI.  Follow-up afterwards.

## 2021-07-07 NOTE — PHYSICAL EXAM
[Ataxic] : ataxic [Wheelchair] : uses a wheelchair [de-identified] : Examination of the cervical spine reveals no midline or paraspinal tenderness to palpation. No cervical lymphadenopathy. Decreased range of motion with respect to flexion, extension, rotation, and lateral bending.  Equivocal Spurlings. Negative Lhermitte's. Full range of motion bilateral shoulders without evidence of impingement. No instability of bilateral upper extremities.  Cranial nerves II through XII grossly intact. Intact sensation bilateral upper extremities. 4+/5 deltoids biceps triceps wrist extensors wrist flexors finger flexors and hand intrinsics. 1+ biceps triceps and brachioradialis reflexes. Negative Brewer's. 2+ radial pulse. Negative Tinel's over the cubital and carpal tunnel. No skin lesions on the right and left upper extremities. [de-identified] : Cervical spine x-rays reveal spondylosis and some spondylolisthesis

## 2021-07-12 ENCOUNTER — APPOINTMENT (OUTPATIENT)
Dept: RHEUMATOLOGY | Facility: CLINIC | Age: 76
End: 2021-07-12

## 2021-07-28 ENCOUNTER — APPOINTMENT (OUTPATIENT)
Dept: MRI IMAGING | Facility: CLINIC | Age: 76
End: 2021-07-28
Payer: MEDICARE

## 2021-07-28 ENCOUNTER — OUTPATIENT (OUTPATIENT)
Dept: OUTPATIENT SERVICES | Facility: HOSPITAL | Age: 76
LOS: 1 days | End: 2021-07-28
Payer: COMMERCIAL

## 2021-07-28 DIAGNOSIS — Z00.00 ENCOUNTER FOR GENERAL ADULT MEDICAL EXAMINATION WITHOUT ABNORMAL FINDINGS: ICD-10-CM

## 2021-07-28 DIAGNOSIS — Z84.89 FAMILY HISTORY OF OTHER SPECIFIED CONDITIONS: Chronic | ICD-10-CM

## 2021-07-28 PROCEDURE — 72141 MRI NECK SPINE W/O DYE: CPT | Mod: 26

## 2021-07-28 PROCEDURE — 72141 MRI NECK SPINE W/O DYE: CPT

## 2021-08-16 ENCOUNTER — APPOINTMENT (OUTPATIENT)
Dept: ORTHOPEDIC SURGERY | Facility: CLINIC | Age: 76
End: 2021-08-16
Payer: MEDICARE

## 2021-08-16 DIAGNOSIS — M54.12 RADICULOPATHY, CERVICAL REGION: ICD-10-CM

## 2021-08-16 DIAGNOSIS — M48.02 SPINAL STENOSIS, CERVICAL REGION: ICD-10-CM

## 2021-08-16 PROCEDURE — 99214 OFFICE O/P EST MOD 30 MIN: CPT

## 2021-08-16 NOTE — HISTORY OF PRESENT ILLNESS
[de-identified] : Ms. JENNY DARDEN  is a 75 year old female who presents to the office for a follow-up visit.  She is here to review her MRI results.

## 2021-08-16 NOTE — PHYSICAL EXAM
[Ataxic] : ataxic [Wheelchair] : uses a wheelchair [de-identified] : Examination of the cervical spine reveals no midline or paraspinal tenderness to palpation. No cervical lymphadenopathy. Decreased range of motion with respect to flexion, extension, rotation, and lateral bending.  Equivocal Spurlings. Negative Lhermitte's. Full range of motion bilateral shoulders without evidence of impingement. No instability of bilateral upper extremities.  Cranial nerves II through XII grossly intact. Intact sensation bilateral upper extremities. 4+/5 deltoids biceps triceps wrist extensors wrist flexors finger flexors and hand intrinsics. 1+ biceps triceps and brachioradialis reflexes. Negative Brewer's. 2+ radial pulse. Negative Tinel's over the cubital and carpal tunnel. No skin lesions on the right and left upper extremities. [de-identified] : Reviewed cervical MRI from 7/28/21 ([facility]) does reveal some increased stenosis does reveal some increase stenosis at C3-4 and C4-5.  No spinal cord compression or spinal cord signal abnormality.\par \par Cervical spine x-rays reveal spondylosis and some spondylolisthesis\par

## 2021-08-16 NOTE — DISCUSSION/SUMMARY
[de-identified] : We discussed further treatment options.  We discussed the role of surgery.  She declines surgical intervention.  She wishes to try home exercise program.  We did discuss formal physical therapy but she declines this as well.  She will let me know of any changes or worsening of her symptoms.

## 2021-08-25 LAB
ALBUMIN SERPL ELPH-MCNC: 4 G/DL
ALP BLD-CCNC: 55 U/L
ALT SERPL-CCNC: 10 U/L
ANION GAP SERPL CALC-SCNC: 12 MMOL/L
AST SERPL-CCNC: 16 U/L
BASOPHILS # BLD AUTO: 0.04 K/UL
BASOPHILS NFR BLD AUTO: 0.4 %
BILIRUB SERPL-MCNC: 0.5 MG/DL
BUN SERPL-MCNC: 14 MG/DL
CALCIUM SERPL-MCNC: 9.2 MG/DL
CHLORIDE SERPL-SCNC: 98 MMOL/L
CO2 SERPL-SCNC: 22 MMOL/L
CREAT SERPL-MCNC: 0.71 MG/DL
CRP SERPL-MCNC: 8 MG/L
EOSINOPHIL # BLD AUTO: 0.26 K/UL
EOSINOPHIL NFR BLD AUTO: 2.7 %
ERYTHROCYTE [SEDIMENTATION RATE] IN BLOOD BY WESTERGREN METHOD: 56 MM/HR
HCT VFR BLD CALC: 38.3 %
HGB BLD-MCNC: 12.4 G/DL
IMM GRANULOCYTES NFR BLD AUTO: 0.4 %
LYMPHOCYTES # BLD AUTO: 2 K/UL
LYMPHOCYTES NFR BLD AUTO: 20.9 %
MAN DIFF?: NORMAL
MCHC RBC-ENTMCNC: 31.6 PG
MCHC RBC-ENTMCNC: 32.4 GM/DL
MCV RBC AUTO: 97.7 FL
MONOCYTES # BLD AUTO: 0.79 K/UL
MONOCYTES NFR BLD AUTO: 8.3 %
NEUTROPHILS # BLD AUTO: 6.43 K/UL
NEUTROPHILS NFR BLD AUTO: 67.3 %
PLATELET # BLD AUTO: 181 K/UL
POTASSIUM SERPL-SCNC: 4.2 MMOL/L
PROT SERPL-MCNC: 6.6 G/DL
RBC # BLD: 3.92 M/UL
RBC # FLD: 14.9 %
SODIUM SERPL-SCNC: 133 MMOL/L
WBC # FLD AUTO: 9.56 K/UL

## 2021-08-27 ENCOUNTER — RX RENEWAL (OUTPATIENT)
Age: 76
End: 2021-08-27

## 2021-09-02 ENCOUNTER — APPOINTMENT (OUTPATIENT)
Dept: RHEUMATOLOGY | Facility: CLINIC | Age: 76
End: 2021-09-02
Payer: MEDICARE

## 2021-09-02 PROCEDURE — 99214 OFFICE O/P EST MOD 30 MIN: CPT | Mod: 95

## 2021-09-02 NOTE — HISTORY OF PRESENT ILLNESS
[FreeTextEntry1] : \par INTERVAL\par - off and on joints - saw Dr. Butler who gave her exercises to keep the muscles strong.   \par - head feels heavy when takes the orencia - no othe rsymptoms at the time - doesn’t like getting the injection feels bruised\par - when held the orencia for the covid shot - felt great and had no cmoplaints - wants to hold it again [Home] : at home, [unfilled] , at the time of the visit. [Other Location: e.g. Home (Enter Location, City,State)___] : at [unfilled] [Verbal consent obtained from patient] : the patient, [unfilled] [None] : The patient is currently asymptomatic

## 2021-09-02 NOTE — ASSESSMENT
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 \par \par #hyponatremia \par improved with pedialyts\par - encouraged continued pedialyte use\par - f/u with pcp\par \par #neck pain \par chronic with no paraesthesias -but some head pain saw ortho/spine  r/o c1/c2 subluxation - instability at C4/5 \par -  [place]\par - patinet to f/u spine - doing exerciise\par \par #RA.   \par Erosive end-state - improvement in pain with increased mtx though not adherent to the regimen for unclear reasons\par - continue orencia and methotrexate for now \par - wants to try to taper the orencia off a bit - feels terrible when takes the orencia - taper to alternate day\par \par #chronic pain.  multifactorial\par - meloxicam didn’t help as much as alieve - dc meloxicam\par - ? cymbalta\par \par #GERD.  in setting of HH\par - paitent feels the folate is resulting in this\par - however GERD is chronic and has tried many medications through the years\par - advised going back to the GI doc - perhaps an EGD\par - patient can try to limit the folic acid and reassess the symptoms\par \par #Anxiety. \par - declining paxel from PCP\par - discussed r/b/a of cymbalta - as this may help both anxiety and chronic pain - r/b/a discussed - will consider\par \par #OP\par - on boniva since 2009 - taking a holiday\par \par #medical monitoring - with side effect / v58.69\par -oral ulcer likely 2/2 mtx dose increase - increase folic acid to 2 tabs daily\par - labs reviewed with patient from earlier in the week\par - Quant Negative - May 2019 \par \par #social determinants\par -  juanita now has a heart condition which has been difficult to continue to care for her \par - moved to new apartment which is going well\par \par #Barriers to care.\par - patient anxious with blood drawers and doctors visit\par -  is supportive and sole caregiver\par - not leaving house atthis time \par

## 2021-11-17 ENCOUNTER — LABORATORY RESULT (OUTPATIENT)
Age: 76
End: 2021-11-17

## 2021-11-19 ENCOUNTER — RX RENEWAL (OUTPATIENT)
Age: 76
End: 2021-11-19

## 2021-12-02 ENCOUNTER — APPOINTMENT (OUTPATIENT)
Dept: RHEUMATOLOGY | Facility: CLINIC | Age: 76
End: 2021-12-02
Payer: MEDICARE

## 2021-12-02 DIAGNOSIS — T84.039A MECHANICAL LOOSENING OF UNSPECIFIED INTERNAL PROSTHETIC JOINT, INITIAL ENCOUNTER: ICD-10-CM

## 2021-12-02 PROCEDURE — 99214 OFFICE O/P EST MOD 30 MIN: CPT | Mod: 95

## 2021-12-02 NOTE — ASSESSMENT
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 \par \par f/u January 6 TEB at 11:45 AM\par arrange for lab fly to house for b/w on January 3\par \par #neck pain \par chronic with no paraesthesias -but some head pain saw ortho/spine  r/o c1/c2 subluxation - instability at C4/5 \par -  [place]\par - patinet to f/u spine - doing exerciise\par \par #RA.   \par Erosive end-state - improvement in pain with increased mtx though not adherent to the regimen for unclear reasons\par - continue orencia and methotrexate for now \par - wants to try to taper the orencia off a bit - feels terrible when takes the orencia - taper to alternate week\par \par #chronic pain.  multifactorial\par - meloxicam didn’t help as much as alieve - dc meloxicam\par - ? cymbalta r/b/a discussed wants to proceed-  also agrees and wants to proceed\par \par #GERD.  in setting of HH\par - paitent feels the folate is resulting in this\par - however GERD is chronic and has tried many medications through the years\par - advised going back to the GI doc - perhaps an EGD\par - patient can try to limit the folic acid and reassess the symptoms\par \par #Anxiety. \par -- declining paxel from PCP\par -- discussed r/b/a of cymbalta - as this may help both anxiety and chronic pain - r/b/a discussed - will consider\par -- behavioral helath consult\par \par #OP\par -- on boniva since 2009 - taking a holiday (stopped 2021)\par \par #medical monitoring - with side effect / v58.69\par -- oral ulcer likely 2/2 mtx dose increase - increase folic acid to 2 tabs daily\par -- labs reviewed with patient from earlier in the week\par -- Quant Negative - May 2019 \par \par #social determinants\par -  juanita now has a heart condition which has been difficult to continue to care for her \par - moved to new apartment which is going well\par \par #Barriers to care.\par - patient anxious with blood drawers and doctors visit\par -  is supportive and sole caregiver\par - not leaving house atthis time \par -- just moved to [place]\par \par More than 50% of the encounter was spent counseling the patient on differential, workup, disease course and treatment/management.  Education was provided to the patient during this encounter.  All questions and concerns were addressed and answered.   The patient verbalized understanding and agreed to the plan. \par \par Patient has been instructed to call for an appointment if new symptoms develop.\par Patient has been instructed to make a followup appointment in 1 months.\par

## 2021-12-02 NOTE — HISTORY OF PRESENT ILLNESS
[FreeTextEntry1] : INTERVAL HX\par up and walking around today \par - feels about the same - smiling and happy today \par - alternate week orencia - but fingers, wrists and feet started to pain - so restarted the once weekly again \par - otherwise no complaints \par - hiar is better and now does take the extra dose of folate \par - tried the duloxetine a couple of years ago - r/b/a discussed - would benefit for her both anxiety and chronic pain and the secondary OA [Home] : at home, [unfilled] , at the time of the visit. [Other Location: e.g. Home (Enter Location, City,State)___] : at [unfilled] [Verbal consent obtained from patient] : the patient, [unfilled] [None] : The patient is currently asymptomatic

## 2021-12-15 ENCOUNTER — NON-APPOINTMENT (OUTPATIENT)
Age: 76
End: 2021-12-15

## 2021-12-17 ENCOUNTER — NON-APPOINTMENT (OUTPATIENT)
Age: 76
End: 2021-12-17

## 2022-01-06 ENCOUNTER — APPOINTMENT (OUTPATIENT)
Dept: RHEUMATOLOGY | Facility: CLINIC | Age: 77
End: 2022-01-06
Payer: MEDICARE

## 2022-01-06 PROCEDURE — 99214 OFFICE O/P EST MOD 30 MIN: CPT | Mod: 95

## 2022-01-06 NOTE — HISTORY OF PRESENT ILLNESS
[FreeTextEntry1] : INTERVAL HX\par feels good\par no joint pain - feeling okay \par in the new apartment\par  is doing okay\par had a booster shot yesterday \par and stopped themeds 1 week before - restarting it next week\par doing therapy every week - almost 1 hour [Home] : at home, [unfilled] , at the time of the visit. [Other Location: e.g. Home (Enter Location, City,State)___] : at [unfilled] [Verbal consent obtained from patient] : the patient, [unfilled] [None] : The patient is currently asymptomatic

## 2022-01-06 NOTE — ASSESSMENT
[FreeTextEntry1] : 72 yo with OP, Anxiety, chronic pain and severe deforming RA now with subluxation of multiple joints and s/p bilateral knee and hip replacements. \par \par f/u April 28 TEB at 11:30 AM (patient aware)\par set up home lab draw on monday April 25\par \par \par #neck pain \par chronic with no paraesthesias -but some head pain saw ortho/spine  r/o c1/c2 subluxation - instability at C4/5 \par -  [place]\par - patinet to f/u spine - doing exerciise\par \par #RA.   \par Erosive end-state - improvement in pain with increased mtx though not adherent to the regimen for unclear reasons\par - continue orencia and methotrexate for now \par \par #chronic pain.  multifactorial\par doing better this month  iddnt tolerate cymbalta or meloxciam \par -- observe for now\par \par #GERD.  in setting of HH\par - paitent feels the folate is resulting in this\par - however GERD is chronic and has tried many medications through the years\par - advised going back to the GI doc - perhaps an EGD\par - patient can try to limit the folic acid and reassess the symptoms\par \par #Anxiety. \par -- declining paxel from PCP\par -- visual hallucination after 1-3 days of cymbalta - now off - observe for now\par -- psychology sessions seem to be going well - continue for now\par \par #OP\par -- on boniva since 2009 - taking a holiday (stopped 2021)\par \par #medical monitoring - with side effect / v58.69\par -- oral ulcer likely 2/2 mtx dose increase - increase folic acid to 2 tabs daily\par -- labs reviewed with patient from earlier in the week\par -- Quant Negative - May 2019 \par \par #social determinants\par -  juanita now has a heart condition which has been difficult to continue to care for her \par - moved to new apartment which is going well\par \par #Barriers to care.\par - patient anxious with blood drawers and doctors visit\par -  is supportive and sole caregiver\par - not leaving house atthis time \par -- just moved to [place]\par \par More than 50% of the encounter was spent counseling the patient on differential, workup, disease course and treatment/management.  Education was provided to the patient during this encounter.  All questions and concerns were addressed and answered.   The patient verbalized understanding and agreed to the plan. \par \par Patient has been instructed to call for an appointment if new symptoms develop.\par Patient has been instructed to make a followup appointment in 4 months.\par

## 2022-04-13 ENCOUNTER — RX RENEWAL (OUTPATIENT)
Age: 77
End: 2022-04-13

## 2022-04-18 LAB
25(OH)D3 SERPL-MCNC: 62.2 NG/ML
ALBUMIN SERPL ELPH-MCNC: 3.9 G/DL
ALP BLD-CCNC: 68 U/L
ALT SERPL-CCNC: 13 U/L
ANION GAP SERPL CALC-SCNC: 13 MMOL/L
AST SERPL-CCNC: 24 U/L
BASOPHILS # BLD AUTO: 0.04 K/UL
BASOPHILS NFR BLD AUTO: 0.5 %
BILIRUB SERPL-MCNC: 0.3 MG/DL
BUN SERPL-MCNC: 14 MG/DL
CALCIUM SERPL-MCNC: 9.2 MG/DL
CHLORIDE SERPL-SCNC: 97 MMOL/L
CO2 SERPL-SCNC: 21 MMOL/L
CREAT SERPL-MCNC: 0.62 MG/DL
CRP SERPL-MCNC: 4 MG/L
EGFR: 92 ML/MIN/1.73M2
EOSINOPHIL # BLD AUTO: 0.37 K/UL
EOSINOPHIL NFR BLD AUTO: 4.4 %
ERYTHROCYTE [SEDIMENTATION RATE] IN BLOOD BY WESTERGREN METHOD: 79 MM/HR
HCT VFR BLD CALC: 37.4 %
HGB BLD-MCNC: 12.1 G/DL
IMM GRANULOCYTES NFR BLD AUTO: 0.2 %
LYMPHOCYTES # BLD AUTO: 2.57 K/UL
LYMPHOCYTES NFR BLD AUTO: 30.3 %
MAN DIFF?: NORMAL
MCHC RBC-ENTMCNC: 30.5 PG
MCHC RBC-ENTMCNC: 32.4 GM/DL
MCV RBC AUTO: 94.2 FL
MONOCYTES # BLD AUTO: 0.54 K/UL
MONOCYTES NFR BLD AUTO: 6.4 %
NEUTROPHILS # BLD AUTO: 4.93 K/UL
NEUTROPHILS NFR BLD AUTO: 58.2 %
PLATELET # BLD AUTO: 244 K/UL
POTASSIUM SERPL-SCNC: 4.2 MMOL/L
PROT SERPL-MCNC: 7 G/DL
RBC # BLD: 3.97 M/UL
RBC # FLD: 14.6 %
SODIUM SERPL-SCNC: 131 MMOL/L
WBC # FLD AUTO: 8.47 K/UL

## 2022-04-28 ENCOUNTER — APPOINTMENT (OUTPATIENT)
Dept: RHEUMATOLOGY | Facility: CLINIC | Age: 77
End: 2022-04-28
Payer: MEDICARE

## 2022-04-28 PROCEDURE — 99215 OFFICE O/P EST HI 40 MIN: CPT | Mod: 95

## 2022-05-03 NOTE — ASSESSMENT
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 \par \par f/u June 17 live in person in [place] - Friday - at 10:45 AM (patient aware) \par send patient copy of labs\par send patient a copy of the letter written for her-- needs letter for new orthotic.  very expensive without insurance coverage.  currently her arthritis and deformity has led to rubbing of her joint/skin against the shoe/orthotic.   There is concern that this will result in blister or ulcer formation.  Given her aiCTD as well as the immunosuppressants that she is on, this would set her up for life threatening infection.\par \par #neck pain \par chronic with no paraesthesias -but some head pain saw ortho/spine  r/o c1/c2 subluxation - instability at C4/5 \par -  [place]\par - patient to f/u spine - doing exercises\par - pain resolved in the neck - not using the collar because wants to strengthen the muscles\par \par #RA.   \par Erosive end-state - improvement in pain with increased mtx though not adherent to the regimen for unclear reasons\par -- check inflammatory markers next visit \par -- can take take advil/alieve prn pain \par -- currently taking the methotrexate 6 tabs every week - declines going to 8 tabs\par -- continue the orencia\par -- continuing with podiatry - needs new orthotic but medicaid doesn’t pay \par \par #chronic pain.  multifactorial\par doing better this month  iddnt tolerate cymbalta or meloxciam \par -- observe for now\par \par #GERD.  in setting of HH\par - paitent feels the folate is resulting in this\par - however GERD is chronic and has tried many medications through the years\par - advised going back to the GI doc - perhaps an EGD\par - patient can try to limit the folic acid and reassess the symptoms\par \par #Anxiety. \par -- declining paxel from PCP\par -- psychology sessions seem to be going well - continue for now\par \par #OP\par -- on boniva since 2009 - taking a holiday (stopped 2021)\par \par #medical monitoring - with side effect / v58.69\par -- oral ulcer likely 2/2 mtx dose increase - increase folic acid to 2 tabs daily\par -- labs reviewed with patient from earlier in the week\par -- Quant Negative - 2020\par \par #Barriers to care.\par -- patient anxious with blood drawers and doctors visit\par --  is supportive and sole caregiver\par \par #UTI \par has been working with the visiting nurse \par -- taking the wokd cranberry - and uses it daily in he [place] in the am \par -- wants a UTI stat supplement \par -- has an aide for 7 hours a day\par \par \par #foot OA/RA deformity / damage \par worsening and now getting blisters on the lateral side with dangerous risk to the MTP joint \par -- needs letter for new orthotic.  very expensive without insurance coverage.  currently her arthritis and deformity has led to rubbing of her joint/skin against the shoe/orthotic.   There is concern that this will result in blister or ulcer formation.  Given her aiCTD as well as the immunosuppressants that she is on, this would set her up for life threatening infection.\par \par \par More than 50% of the encounter was spent counseling the patient on differential, workup, disease course and treatment/management.  Education was provided to the patient during this encounter.  All questions and concerns were addressed and answered.   The patient verbalized understanding and agreed to the plan. \par \par Patient has been instructed to call for an appointment if new symptoms develop.\par Patient has been instructed to make a followup appointment in 3 months.\par \par Time spent on the encounter included, but is not limited to, preparing to see the patient, obtaining and/or reviewing separately obtained history, performing the evaluation, counseling and educating, independently interpreting results with communication to patient, order placement, referring and/or communicating with other health professionals as described, and documenting clinical information in the electronic health record\par  \par \par Time spent on the encounter included, but is not limited to, preparing to see the patient, obtaining and/or reviewing separately obtained history, performing the evaluation, counseling and educating, independently interpreting results with communication to patient, order placement, referring and/or communicating with other health professionals as described, and documenting clinical information in the electronic health record\par \par

## 2022-05-03 NOTE — HISTORY OF PRESENT ILLNESS
[Home] : at home, [unfilled] , at the time of the visit. [Other Location: e.g. Home (Enter Location, City,State)___] : at [unfilled] [Verbal consent obtained from patient] : the patient, [unfilled] [None] : The patient is currently asymptomatic [FreeTextEntry1] : INTERVAL HX \par been doing well in general - though developed pain in the fingers in the wrists, fingers, elbows, and shoulders\par - gets some low back pain when sits for too long \par - worsened pain about 3 months ago \par - no swelling - they look the same \par - has stopped the avocado and found out that that lowers the sodium\par - is aware of her OCD problems - but works through it

## 2022-06-06 ENCOUNTER — RX RENEWAL (OUTPATIENT)
Age: 77
End: 2022-06-06

## 2022-06-13 ENCOUNTER — LABORATORY RESULT (OUTPATIENT)
Age: 77
End: 2022-06-13

## 2022-06-17 ENCOUNTER — APPOINTMENT (OUTPATIENT)
Dept: RHEUMATOLOGY | Facility: CLINIC | Age: 77
End: 2022-06-17
Payer: MEDICARE

## 2022-06-17 VITALS
HEART RATE: 86 BPM | DIASTOLIC BLOOD PRESSURE: 56 MMHG | HEIGHT: 61 IN | SYSTOLIC BLOOD PRESSURE: 108 MMHG | OXYGEN SATURATION: 96 % | WEIGHT: 94 LBS | BODY MASS INDEX: 17.75 KG/M2

## 2022-06-17 DIAGNOSIS — M81.0 AGE-RELATED OSTEOPOROSIS W/OUT CURRENT PATHOLOGICAL FRACTURE: ICD-10-CM

## 2022-06-17 PROCEDURE — 99214 OFFICE O/P EST MOD 30 MIN: CPT

## 2022-06-17 RX ORDER — METHYLPREDNISOLONE 4 MG/1
4 TABLET ORAL
Qty: 1 | Refills: 0 | Status: COMPLETED | COMMUNITY
Start: 2021-06-24 | End: 2022-06-17

## 2022-06-17 RX ORDER — LIDOCAINE HYDROCHLORIDE 20 MG/ML
2 JELLY TOPICAL
Qty: 10 | Refills: 0 | Status: COMPLETED | COMMUNITY
Start: 2018-09-28 | End: 2022-06-17

## 2022-06-17 RX ORDER — LIDOCAINE HYDROCHLORIDE 20 MG/ML
2 JELLY TOPICAL
Qty: 1 | Refills: 2 | Status: COMPLETED | COMMUNITY
Start: 2018-12-05 | End: 2022-06-17

## 2022-06-17 RX ORDER — IBANDRONATE SODIUM 150 MG/1
150 TABLET ORAL
Qty: 3 | Refills: 2 | Status: COMPLETED | COMMUNITY
Start: 2019-05-15 | End: 2022-06-17

## 2022-06-20 NOTE — ASSESSMENT
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 \par \par #abnormal lung exam \par crackles on chest exam - breathing well on RA\par -- check Xray\par \par #neck pain \par chronic with no paraesthesias -but some head pain saw ortho/spine  r/o c1/c2 subluxation - instability at C4/5 \par -  [place]\par - patient to f/u spine - doing exercises\par - pain resolved in the neck - not using the collar because wants to strengthen the muscles\par \par #RA.   \par Erosive end-state - improvement in pain with increased mtx though not adherent to the regimen for unclear reasons\par -- check inflammatory markers next visit - reviewed june labs and the CRP has improved on the 6 - declines going to the 8\par -- can take take advil/alieve prn pain \par -- currently taking the methotrexate 6 tabs every week - declines going to 8 tabs\par -- continue the orencia\par -- continuing with podiatry - needs new orthotic but medicaid doesn’t pay \par \par #chronic pain.  multifactorial\par doing better this month  didn't tolerate Cymbalta or meloxicam \par -- observe for now\par \par #GERD.  in setting of HH\par - paitent feels the folate is resulting in this\par - however GERD is chronic and has tried many medications through the years\par - advised going back to the GI doc - perhaps an EGD\par - patient can try to limit the folic acid and reassess the symptoms\par \par #Anxiety. \par -- declining paxel from PCP\par -- psychology sessions seem to be going well - continue for now\par \par #OP\par -- on boniva since 2009 - taking a holiday (stopped 2021)\par -- DEXA\par -- t/c endo referral\par \par #medical monitoring - with side effect / v58.69\par -- oral ulcer likely 2/2 mtx dose increase - increase folic acid to 2 tabs daily\par -- labs reviewed with patient from earlier in the week\par -- Quant Negative - 2020\par \par #Barriers to care.\par -- patient anxious with blood drawers and doctors visit\par --  is supportive and sole caregiver\par \par #foot OA/RA deformity / damage \par worsening and now getting blisters on the lateral side with dangerous risk to the MTP joint \par -- needs letter for new orthotic.  very expensive without insurance coverage.  currently her arthritis and deformity has led to rubbing of her joint/skin against the shoe/orthotic.   There is concern that this will result in blister or ulcer formation.  Given her aiCTD as well as the immunosuppressants that she is on, this would set her up for life threatening infection.\par \par \par More than 50% of the encounter was spent counseling the patient on differential, workup, disease course and treatment/management.  Education was provided to the patient during this encounter.  All questions and concerns were addressed and answered.   The patient verbalized understanding and agreed to the plan. \par \par Patient has been instructed to call for an appointment if new symptoms develop.\par Patient has been instructed to make a followup appointment in 3 months.\par \par Time spent on the encounter included, but is not limited to, preparing to see the patient, obtaining and/or reviewing separately obtained history, performing the evaluation, counseling and educating, independently interpreting results with communication to patient, order placement, referring and/or communicating with other health professionals as described, and documenting clinical information in the electronic health record\par  \par \par

## 2022-06-20 NOTE — HISTORY OF PRESENT ILLNESS
[None] : The patient is currently asymptomatic [FreeTextEntry1] : INTERVAL HX \par been doing well in generall \par tolerates the mtxh \par -reviewed the b/w \par - was unable to get the right shoe \par - occasional pain in the joints - and the elbows \par - no swelling - they look the same \par

## 2022-06-20 NOTE — PHYSICAL EXAM
[General Appearance - Alert] : alert [General Appearance - In No Acute Distress] : in no acute distress [Oriented To Time, Place, And Person] : oriented to person, place, and time [Impaired Insight] : insight and judgment were intact [Affect] : the affect was normal [Mood] : the mood was normal [] : no respiratory distress [Respiration, Rhythm And Depth] : normal respiratory rhythm and effort [Edema] : there was no peripheral edema [FreeTextEntry1] : anxious

## 2022-09-01 ENCOUNTER — NON-APPOINTMENT (OUTPATIENT)
Age: 77
End: 2022-09-01

## 2022-09-02 ENCOUNTER — APPOINTMENT (OUTPATIENT)
Dept: RHEUMATOLOGY | Facility: CLINIC | Age: 77
End: 2022-09-02

## 2022-09-02 PROCEDURE — 99443: CPT

## 2022-09-02 NOTE — ASSESSMENT
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 \par \par #pulnonary fibrosis\par crackles on chest exam during exam - breathing well on RA. cxr with fibrotic changes.  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  r/b/a discussed - they decline for now but will conisder in october fater their pulmonary visit\par \par #RA.   \par Erosive end-state, damage.   previosuly on TNF-inh and currently on MTX/orencia which has helped for pain -   improvement in pain with increased mtx though not adherent to the regimen 2/2 anxiety.   Joints with pain from degenerative sequela rather than acute inflammatory changes.  \par -- can take take advil/alieve prn pain \par -- currently taking the methotrexate 6 tabs every week - declines going to 8 tabs\par -- continue the orencia for now. \par -- d/w patietn and hsuband switch in therapy - given the pulmonary fibrosis - eg rtx - r/b/a discussed - they decline for now and would want to wait to think about it.  \par -- continuing with podiatry - needs new orthotic but medicaid doesn’t pay \par \par #chronic pain.  multifactorial\par doing better this month  didn't tolerate Cymbalta or meloxicam \par -- observe for now\par \par #OA\par multiple jonits - liekly 2/2 previous inflamoary arhtritis - now with xray with GH OA \par -- observe for now\par #GERD.  in setting of HH\par - paitent feels the folate is resulting in this\par - however GERD is chronic and has tried many medications through the years\par - advised going back to the GI doc - perhaps an EGD\par - patient can try to limit the folic acid and reassess the symptoms\par \par #Anxiety. \par -- declining paxel from PCP\par -- psychology sessions seem to be going well - continue for now\par \par #OP\par -- on boniva since 2009 - taking a holiday (stopped 2021)\par -- DEXA\par -- endo referral\par \par #medical monitoring - with side effect / v58.69\par -- oral ulcer likely 2/2 mtx dose increase - increase folic acid to 2 tabs daily\par -- labs reviewed with patient from earlier in the week\par -- Quant Negative - June 2022\par -- hepatitis negative - June 2022\par \par #Barriers to care.\par -- patient anxious with blood drawers and doctors visit\par --  is supportive and sole caregiver\par \par \par \par More than 50% of the encounter was spent counseling the patient on differential, workup, disease course and treatment/management.  Education was provided to the patient during this encounter.  All questions and concerns were addressed and answered.   The patient verbalized understanding and agreed to the plan. \par \par Patient has been instructed to call for an appointment if new symptoms develop.\par Patient has been instructed to make a followup appointment in 3 months.\par \par Time spent on the encounter included, but is not limited to, preparing to see the patient, obtaining and/or reviewing separately obtained history, performing the evaluation, counseling and educating, independently interpreting results with communication to patient, order placement, referring and/or communicating with other health professionals as described, and documenting clinical information in the electronic health record\par  \par \par

## 2022-09-02 NOTE — HISTORY OF PRESENT ILLNESS
[None] : The patient is currently asymptomatic [Home] : at home, [unfilled] , at the time of the visit. [Other Location: e.g. Home (Enter Location, City,State)___] : at [unfilled] [Verbal consent obtained from patient] : the patient, [unfilled] [FreeTextEntry1] : INTERVAL HX \par been doing well in generall \par tolerates the mtxh \par -reviewed the b/w \par - was unable to get the right shoe \par - occasional pain in the joints - and the elbows \par - no swelling - they look the same \par

## 2022-09-28 ENCOUNTER — APPOINTMENT (OUTPATIENT)
Dept: PULMONOLOGY | Facility: CLINIC | Age: 77
End: 2022-09-28

## 2022-09-28 VITALS
DIASTOLIC BLOOD PRESSURE: 65 MMHG | HEART RATE: 80 BPM | BODY MASS INDEX: 17.94 KG/M2 | OXYGEN SATURATION: 98 % | WEIGHT: 95 LBS | SYSTOLIC BLOOD PRESSURE: 101 MMHG | HEIGHT: 61 IN

## 2022-09-28 PROCEDURE — 71046 X-RAY EXAM CHEST 2 VIEWS: CPT

## 2022-09-28 PROCEDURE — 99204 OFFICE O/P NEW MOD 45 MIN: CPT | Mod: 25

## 2022-09-28 NOTE — ASSESSMENT
[FreeTextEntry1] : Based on interpretation of chest x-ray August 2022 patient does appear to have interstitial lung disease likely related to underlying rheumatoid arthritis.  This appears to have progressed compared to chest CT images of abdominal CAT scan in 2016 and chest x-ray in 2014.  However there does appear to be a second issue with increasing consolidation at the right base.  This may be a bacterial pneumonia inflammatory changes from COVID or even effect of pulmonary embolism considering the patient has hemoptysis and had COVID a month ago.  Will obtain labs start antibiotics do close interval follow-up.  Right now patient not appropriate for PFT considering recent COVID and active hemoptysis.  Once recovered will assess extent of pulmonary involvement by PFT and chest CT.  We will then discuss with rheumatology if there is further room to increase rheumatologic meds if evidence of interstitial lung disease.  Addition of drug charges Esbriet/Ofev not as of yet of proven value in rheumatoid arthritis although it is being used in scleroderma.  Part of this has to do with what the project of time course of the lung disease will be.  She does appear to have a slowly progressive disease although with the superimposed changes seen today it is difficult to .

## 2022-09-28 NOTE — HISTORY OF PRESENT ILLNESS
[Never] : never [TextBox_4] : Patient referred for evaluation of abnormal chest x-ray.  Longstanding rheumatoid arthritis on multiple medications.  Did not have pulmonary complaints at time of rheumatologic evaluation noted to have abnormal pulmonary physical exam and referred for chest x-ray.  Subsequent to this x-ray patient developed COVID.  She was not particularly ill.  She was prescribed antiviral therapy (Paxlovid) which she did not take because of the difficulty in swallowing large tablets.\par \par She has been recovering from COVID however over the last few days she developed increasing cough and this morning she coughed some bloody sputum she does not report that is an ongoing problem.  Minimally dyspneic.  Some chronic cough.

## 2022-09-28 NOTE — REVIEW OF SYSTEMS
[Fatigue] : fatigue [Cough] : cough [Hemoptysis] : hemoptysis [Negative] : Endocrine [TextBox_94] : Joint pains and joint destruction

## 2022-09-28 NOTE — REASON FOR VISIT
[Pulmonary Fibrosis] : pulmonary fibrosis [Initial] : an initial visit [Abnormal CXR/ Chest CT] : an abnormal CXR/ chest CT

## 2022-09-28 NOTE — PHYSICAL EXAM
[No Acute Distress] : no acute distress [Normal Oropharynx] : normal oropharynx [Normal Appearance] : normal appearance [No Neck Mass] : no neck mass [Normal Rate/Rhythm] : normal rate/rhythm [Normal S1, S2] : normal s1, s2 [No Murmurs] : no murmurs [No Abnormalities] : no abnormalities [Benign] : benign [Normal Gait] : normal gait [No Cyanosis] : no cyanosis [FROM] : FROM [Normal Color/ Pigmentation] : normal color/ pigmentation [No Focal Deficits] : no focal deficits [Oriented x3] : oriented x3 [Normal Affect] : normal affect [TextBox_2] : Frail-appearing female [TextBox_68] : Bilateral crackles senior living up [TextBox_105] : Marked joint changes and deviation of joints and fingers

## 2022-09-29 LAB
ALBUMIN SERPL ELPH-MCNC: 4 G/DL
ALP BLD-CCNC: 65 U/L
ALT SERPL-CCNC: 8 U/L
ANION GAP SERPL CALC-SCNC: 14 MMOL/L
AST SERPL-CCNC: 21 U/L
BASOPHILS # BLD AUTO: 0.05 K/UL
BASOPHILS NFR BLD AUTO: 0.4 %
BILIRUB SERPL-MCNC: 0.6 MG/DL
BUN SERPL-MCNC: 9 MG/DL
CALCIUM SERPL-MCNC: 9.9 MG/DL
CHLORIDE SERPL-SCNC: 97 MMOL/L
CO2 SERPL-SCNC: 20 MMOL/L
COVID-19 NUCLEOCAPSID  GAM ANTIBODY INTERPRETATION: NEGATIVE
COVID-19 SPIKE DOMAIN ANTIBODY INTERPRETATION: POSITIVE
CREAT SERPL-MCNC: 0.62 MG/DL
CRP SERPL-MCNC: 14 MG/L
DEPRECATED D DIMER PPP IA-ACNC: 232 NG/ML DDU
EGFR: 91 ML/MIN/1.73M2
EOSINOPHIL # BLD AUTO: 0.28 K/UL
EOSINOPHIL NFR BLD AUTO: 2.4 %
FERRITIN SERPL-MCNC: 317 NG/ML
GLUCOSE SERPL-MCNC: 124 MG/DL
HCT VFR BLD CALC: 40 %
HGB BLD-MCNC: 13.4 G/DL
IMM GRANULOCYTES NFR BLD AUTO: 0.5 %
LYMPHOCYTES # BLD AUTO: 1.72 K/UL
LYMPHOCYTES NFR BLD AUTO: 14.9 %
MAN DIFF?: NORMAL
MCHC RBC-ENTMCNC: 31.2 PG
MCHC RBC-ENTMCNC: 33.5 GM/DL
MCV RBC AUTO: 93 FL
MONOCYTES # BLD AUTO: 0.67 K/UL
MONOCYTES NFR BLD AUTO: 5.8 %
NEUTROPHILS # BLD AUTO: 8.74 K/UL
NEUTROPHILS NFR BLD AUTO: 76 %
PLATELET # BLD AUTO: 275 K/UL
POTASSIUM SERPL-SCNC: 4.7 MMOL/L
PROCALCITONIN SERPL-MCNC: 0.04 NG/ML
PROT SERPL-MCNC: 7.1 G/DL
RBC # BLD: 4.3 M/UL
RBC # FLD: 14.5 %
SARS-COV-2 AB SERPL IA-ACNC: >250 U/ML
SARS-COV-2 AB SERPL QL IA: 0.13 INDEX
SODIUM SERPL-SCNC: 131 MMOL/L
WBC # FLD AUTO: 11.52 K/UL

## 2022-10-07 ENCOUNTER — NON-APPOINTMENT (OUTPATIENT)
Age: 77
End: 2022-10-07

## 2022-10-17 LAB
ALBUMIN SERPL ELPH-MCNC: 3.9 G/DL
ALP BLD-CCNC: 70 U/L
ALT SERPL-CCNC: 8 U/L
ANION GAP SERPL CALC-SCNC: 12 MMOL/L
AST SERPL-CCNC: 18 U/L
BASOPHILS # BLD AUTO: 0.04 K/UL
BASOPHILS NFR BLD AUTO: 0.4 %
BILIRUB SERPL-MCNC: 0.3 MG/DL
BUN SERPL-MCNC: 13 MG/DL
CALCIUM SERPL-MCNC: 9.2 MG/DL
CHLORIDE SERPL-SCNC: 92 MMOL/L
CO2 SERPL-SCNC: 24 MMOL/L
CREAT SERPL-MCNC: 0.59 MG/DL
CRP SERPL-MCNC: 17 MG/L
EGFR: 92 ML/MIN/1.73M2
EOSINOPHIL # BLD AUTO: 0.37 K/UL
EOSINOPHIL NFR BLD AUTO: 3.4 %
ERYTHROCYTE [SEDIMENTATION RATE] IN BLOOD BY WESTERGREN METHOD: 76 MM/HR
HCT VFR BLD CALC: 34.9 %
HGB BLD-MCNC: 11.9 G/DL
IMM GRANULOCYTES NFR BLD AUTO: 0.6 %
LYMPHOCYTES # BLD AUTO: 1.99 K/UL
LYMPHOCYTES NFR BLD AUTO: 18.5 %
MAN DIFF?: NORMAL
MCHC RBC-ENTMCNC: 31.3 PG
MCHC RBC-ENTMCNC: 34.1 GM/DL
MCV RBC AUTO: 91.8 FL
MONOCYTES # BLD AUTO: 1.01 K/UL
MONOCYTES NFR BLD AUTO: 9.4 %
NEUTROPHILS # BLD AUTO: 7.28 K/UL
NEUTROPHILS NFR BLD AUTO: 67.7 %
PLATELET # BLD AUTO: 266 K/UL
POTASSIUM SERPL-SCNC: 4.5 MMOL/L
PROT SERPL-MCNC: 6.6 G/DL
RBC # BLD: 3.8 M/UL
RBC # FLD: 14.7 %
SODIUM SERPL-SCNC: 128 MMOL/L
WBC # FLD AUTO: 10.75 K/UL

## 2022-10-19 ENCOUNTER — NON-APPOINTMENT (OUTPATIENT)
Age: 77
End: 2022-10-19

## 2022-10-20 ENCOUNTER — APPOINTMENT (OUTPATIENT)
Dept: RHEUMATOLOGY | Facility: CLINIC | Age: 77
End: 2022-10-20

## 2022-10-20 DIAGNOSIS — R26.89 OTHER ABNORMALITIES OF GAIT AND MOBILITY: ICD-10-CM

## 2022-10-20 PROCEDURE — 99215 OFFICE O/P EST HI 40 MIN: CPT | Mod: 95

## 2022-10-21 ENCOUNTER — APPOINTMENT (OUTPATIENT)
Dept: RHEUMATOLOGY | Facility: CLINIC | Age: 77
End: 2022-10-21

## 2022-10-21 LAB
AA PROT SER-MCNC: 31 UG/ML
BIOMARKER COMMENT: NORMAL
CRP SERPL-MCNC: 19 MG/L
EGF SERPL-MCNC: 110 PG/ML
FOOTNOTE: NORMAL
IL6 SERPL-MCNC: 20 PG/ML
LEPTIN SERPL-MCNC: 2.4 NG/ML
Lab: NORMAL
Lab: NORMAL
MMP-1 SERPL-MCNC: 12 NG/ML
MMP-3 SERPL-MCNC: 15 NG/ML
RA DAS LEVEL QL VECTRADA: NORMAL
RESISTIN SERPL-MCNC: 9.7 NG/ML
RISK OF RADIOGRAPHIC PROGRESS: 9 %
TNFRSF1A SERPL-MCNC: 0.34 NG/ML
VAP-1 SERPL-MCNC: 0.62 UG/ML
VECTRA SCORE: 51
VEGF-A SERPL-MCNC: 170 PG/ML
YKL-40 RESULT: 51 NG/ML

## 2022-10-24 NOTE — HISTORY OF PRESENT ILLNESS
[Home] : at home, [unfilled] , at the time of the visit. [Other Location: e.g. Home (Enter Location, City,State)___] : at [unfilled] [Verbal consent obtained from patient] : the patient, [unfilled] [None] : The patient is currently asymptomatic [FreeTextEntry1] : Ms. Najera has a PMHx of erosive RA, dementia here for f/u\par \par presenting hx\par \par # RA - developed at age 25.  Was severe at the beginning with inflammation.  Initially was treated in [place] with years of steroids and antiinflammatory only.  [place] the [place] in [place].  Remembers being on Enbrel, Humira and most recently methotrexate and Orencia (SQ).    Has had both knees and hip replaced at this point.  mother had RA as well.   Currently denies inflammation - there is no morning stiffness and her joints don’t get swollen or red any longer.  She has little pain in her joints except for those that have dislocated and subluxed.  Her biggest limitation and source of pain currently is her elbows (both) and her left shoulder.  She also gets low back pain.  all these symptoms are chronic for at least a year.\par \par #joint replacement hx\par -1996 bkr in [facility] \par -2003 right hip replacement but wasn’t able to get exact size but a larger hip was used -\par -2007 replaced left\par -2010 right hip revision with a femur\par -2014 right knee revision (nagi renee)\par \par #OP - unsure of last bone density - has been on monthly boniva.  \par

## 2022-10-24 NOTE — ASSESSMENT
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 \par \par #pulnonary fibrosis\par crackles on chest exam during exam - breathing well on RA. cxr with fibrotic changes.  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  r/b/a discussed - they decline for now and are very anxious about switching anything \par -- is willing to go to the pulmonologist to do the PFT\par \par #hyponatremia\par paitent quite anxioud over this \par -- discussed with her in detail and no s/s related to it \par -- did d/w PCP as well \par -- encouraged her to f/u with her PCP\par \par #RA.   \par Erosive end-state, damage.   previosuly on TNF-inh and currently on MTX/orencia which has helped for pain -   improvement in pain with increased mtx though not adherent to the regimen 2/2 anxiety.   Joints with pain from degenerative sequela rather than acute inflammatory changes.  \par -- can take take advil/alieve prn pain \par -- currently taking the methotrexate 6 tabs every week - declines going to 8 tabs\par -- continue the orencia for now. \par -- d/w patietn and hsuband switch in therapy - given the pulmonary fibrosis - eg rtx - r/b/a discussed - they decline for now and would want to wait to think about it.  \par -- continuing with podiatry - needs new orthotic but medicaid doesn’t pay \par \par #chronic pain.  multifactorial\par doing better this month  didn't tolerate Cymbalta or meloxicam \par -- observe for now\par \par #OA\par multiple jonits - liekly 2/2 previous inflamoary arhtritis - now with xray with GH OA \par -- observe for now\par #GERD.  in setting of HH\par - paitent feels the folate is resulting in this\par - however GERD is chronic and has tried many medications through the years\par - advised going back to the GI doc - perhaps an EGD\par - patient can try to limit the folic acid and reassess the symptoms\par \par #Anxiety. \par -- declining paxel from PCP\par -- psychology sessions seem to be going well - continue for now\par \par #OP\par -- on boniva since 2009 - taking a holiday (stopped 2021)\par -- DEXA\par -- endo referral\par \par #medical monitoring - with side effect / v58.69\par -- oral ulcer likely 2/2 mtx dose increase - increase folic acid to 2 tabs daily\par -- labs reviewed with patient from earlier in the week\par -- Quant Negative - June 2022\par -- hepatitis negative - June 2022\par \par #Barriers to care.\par -- patient anxious with blood drawers and doctors visit\par --  is supportive and sole caregiver\par \par \par #covid \par was recovering from covid - september b/w with neg NC - though pershasp was too soon for this reposne vs muted response 2/2 current immunopsuppression \par -- rech \par More than 50% of the encounter was spent counseling the patient on differential, workup, disease course and treatment/management.  Education was provided to the patient during this encounter.  All questions and concerns were addressed and answered.   The patient verbalized understanding and agreed to the plan. \par \par Patient has been instructed to call for an appointment if new symptoms develop.\par Patient has been instructed to make a followup appointment in 3 months.\par \par Time spent on the encounter included, but is not limited to, preparing to see the patient, obtaining and/or reviewing separately obtained history, performing the evaluation, counseling and educating, independently interpreting results with communication to patient, order placement, referring and/or communicating with other health professionals as described, and documenting clinical information in the electronic health record\par  \par \par

## 2022-11-01 ENCOUNTER — NON-APPOINTMENT (OUTPATIENT)
Age: 77
End: 2022-11-01

## 2022-11-09 ENCOUNTER — APPOINTMENT (OUTPATIENT)
Dept: PULMONOLOGY | Facility: CLINIC | Age: 77
End: 2022-11-09

## 2022-11-09 VITALS
OXYGEN SATURATION: 99 % | HEIGHT: 61 IN | DIASTOLIC BLOOD PRESSURE: 88 MMHG | WEIGHT: 95 LBS | HEART RATE: 80 BPM | SYSTOLIC BLOOD PRESSURE: 131 MMHG | BODY MASS INDEX: 17.94 KG/M2

## 2022-11-09 PROCEDURE — ZZZZZ: CPT

## 2022-11-09 PROCEDURE — 71046 X-RAY EXAM CHEST 2 VIEWS: CPT

## 2022-11-09 PROCEDURE — 94010 BREATHING CAPACITY TEST: CPT

## 2022-11-09 PROCEDURE — 99214 OFFICE O/P EST MOD 30 MIN: CPT | Mod: 25

## 2022-11-09 NOTE — HISTORY OF PRESENT ILLNESS
[Never] : never [TextBox_4] : Prior: Patient referred for evaluation of abnormal chest x-ray.  Longstanding rheumatoid arthritis on multiple medications.  Did not have pulmonary complaints at time of rheumatologic evaluation noted to have abnormal pulmonary physical exam and referred for chest x-ray.  Subsequent to this x-ray patient developed COVID.  She was not particularly ill.  She was prescribed antiviral therapy (Paxlovid) which she did not take because of the difficulty in swallowing large tablets.\par \par She has been recovering from COVID however over the last few days she developed increasing cough and this morning she coughed some bloody sputum she does not report that is an ongoing problem.  Minimally dyspneic.  Some chronic cough. \par \par Current: Recovered from COVID continues with shortness of breath fatigue lack of energy.  Here for follow-up x-ray and PFT

## 2022-11-09 NOTE — PHYSICAL EXAM
[No Acute Distress] : no acute distress [Normal Oropharynx] : normal oropharynx [Normal Appearance] : normal appearance [No Neck Mass] : no neck mass [Normal Rate/Rhythm] : normal rate/rhythm [Normal S1, S2] : normal s1, s2 [No Murmurs] : no murmurs [No Abnormalities] : no abnormalities [Benign] : benign [Normal Gait] : normal gait [No Cyanosis] : no cyanosis [FROM] : FROM [Normal Color/ Pigmentation] : normal color/ pigmentation [No Focal Deficits] : no focal deficits [Oriented x3] : oriented x3 [Normal Affect] : normal affect [TextBox_2] : Frail-appearing female [TextBox_68] : Bilateral crackles [facility] up [TextBox_105] : Marked joint changes and deviation of joints and fingers

## 2022-11-09 NOTE — ASSESSMENT
[FreeTextEntry1] : Based on interpretation of chest x-ray August 2022 patient does appear to have interstitial lung disease likely related to underlying rheumatoid arthritis.  There are persistent abnormalities at the right base which may have been related to COVID but are not resolving.  Should have chest CT.  Based on CT will evaluate if there is a diagnosis other than rheumatoid arthritis involved-Will discuss treatment options with rheumatology will be very difficult to monitor treatment in the absence of reliable PFTs.

## 2022-11-09 NOTE — REASON FOR VISIT
[Follow-Up] : a follow-up visit [Abnormal CXR/ Chest CT] : an abnormal CXR/ chest CT [Pulmonary Fibrosis] : pulmonary fibrosis

## 2022-11-16 ENCOUNTER — OUTPATIENT (OUTPATIENT)
Dept: OUTPATIENT SERVICES | Facility: HOSPITAL | Age: 77
LOS: 1 days | End: 2022-11-16
Payer: COMMERCIAL

## 2022-11-16 ENCOUNTER — APPOINTMENT (OUTPATIENT)
Dept: CT IMAGING | Facility: CLINIC | Age: 77
End: 2022-11-16

## 2022-11-16 DIAGNOSIS — Z00.8 ENCOUNTER FOR OTHER GENERAL EXAMINATION: ICD-10-CM

## 2022-11-16 DIAGNOSIS — J84.10 PULMONARY FIBROSIS, UNSPECIFIED: ICD-10-CM

## 2022-11-16 DIAGNOSIS — Z84.89 FAMILY HISTORY OF OTHER SPECIFIED CONDITIONS: Chronic | ICD-10-CM

## 2022-11-16 PROCEDURE — 71250 CT THORAX DX C-: CPT | Mod: 26

## 2022-11-16 PROCEDURE — 71250 CT THORAX DX C-: CPT

## 2022-11-22 ENCOUNTER — NON-APPOINTMENT (OUTPATIENT)
Age: 77
End: 2022-11-22

## 2022-11-23 ENCOUNTER — APPOINTMENT (OUTPATIENT)
Dept: PULMONOLOGY | Facility: CLINIC | Age: 77
End: 2022-11-23

## 2022-11-23 VITALS
WEIGHT: 95 LBS | DIASTOLIC BLOOD PRESSURE: 74 MMHG | SYSTOLIC BLOOD PRESSURE: 121 MMHG | HEART RATE: 80 BPM | HEIGHT: 61 IN | OXYGEN SATURATION: 99 % | BODY MASS INDEX: 17.94 KG/M2

## 2022-11-23 DIAGNOSIS — R04.2 HEMOPTYSIS: ICD-10-CM

## 2022-11-23 PROCEDURE — 99214 OFFICE O/P EST MOD 30 MIN: CPT

## 2022-11-23 NOTE — ASSESSMENT
[FreeTextEntry1] : Chest CT shows evidence of interstitial disease consistent with rheumatoid lung disease.  There is an area of peripheral consolidation with cavitation.  This appeared contextually in the context of COVID but there is a broad differential diagnosis including tuberculosis and other opportunistic infections\par \par Will attempt to obtain sputum culture.  Patient is high risk for bronchoscopy because of previous adverse anesthesia outcomes (details not available) as well as potential issues with cervical spine involvement from rheumatoid arthritis.  If sputum cannot be obtained or nondiagnostic will obtain interval follow-up chest CT recommend repeating QuantiFERON test as well

## 2022-11-23 NOTE — HISTORY OF PRESENT ILLNESS
[Never] : never [TextBox_4] : Prior: Patient referred for evaluation of abnormal chest x-ray.  Longstanding rheumatoid arthritis on multiple medications.  Did not have pulmonary complaints at time of rheumatologic evaluation noted to have abnormal pulmonary physical exam and referred for chest x-ray.  Subsequent to this x-ray patient developed COVID.  She was not particularly ill.  She was prescribed antiviral therapy (Paxlovid) which she did not take because of the difficulty in swallowing large tablets.\par \par She has been recovering from COVID however over the last few days she developed increasing cough and this morning she coughed some bloody sputum she does not report that is an ongoing problem.  Minimally dyspneic.  Some chronic cough. \par \par At last visit noted to have right basilar abnormalities on chest x-ray that were not resolving patient referred for CT scan and here for follow-up.  Reports cough productive of whitish sputum in the mornings she feels this mostly "spit" no fever chills weight loss.

## 2022-11-23 NOTE — PHYSICAL EXAM
[No Acute Distress] : no acute distress [Normal Oropharynx] : normal oropharynx [Normal Appearance] : normal appearance [No Neck Mass] : no neck mass [Normal Rate/Rhythm] : normal rate/rhythm [Normal S1, S2] : normal s1, s2 [No Murmurs] : no murmurs [No Abnormalities] : no abnormalities [Benign] : benign [Normal Gait] : normal gait [No Cyanosis] : no cyanosis [FROM] : FROM [Normal Color/ Pigmentation] : normal color/ pigmentation [No Focal Deficits] : no focal deficits [Oriented x3] : oriented x3 [Normal Affect] : normal affect [TextBox_2] : Frail-appearing female [TextBox_68] : Bilateral crackles intermediate up [TextBox_105] : Marked joint changes and deviation of joints and fingers

## 2022-11-25 ENCOUNTER — LABORATORY RESULT (OUTPATIENT)
Age: 77
End: 2022-11-25

## 2022-11-26 LAB
M TB IFN-G BLD-IMP: NEGATIVE
QUANTIFERON TB PLUS MITOGEN MINUS NIL: >10 IU/ML
QUANTIFERON TB PLUS NIL: 0.04 IU/ML
QUANTIFERON TB PLUS TB1 MINUS NIL: 0 IU/ML
QUANTIFERON TB PLUS TB2 MINUS NIL: 0 IU/ML

## 2022-11-27 LAB — BACTERIA SPT CULT: NORMAL

## 2022-12-05 ENCOUNTER — NON-APPOINTMENT (OUTPATIENT)
Age: 77
End: 2022-12-05

## 2022-12-06 ENCOUNTER — APPOINTMENT (OUTPATIENT)
Dept: PULMONOLOGY | Facility: CLINIC | Age: 77
End: 2022-12-06

## 2022-12-06 PROCEDURE — 99442: CPT

## 2022-12-06 NOTE — ASSESSMENT
[FreeTextEntry1] : Cavitary lung lesion in patient with rheumatoid arthritis.  AFB positive on sputum x3 \par Thus far negative for TB\par Await species identification.  Have not excluded the possibility that the cavities are actually related to rheumatoid arthritis will depend on results of culture.  From discussion with patient she sounds clinically stable we will make no change in regimen pending results\par \par Time 12 minutes

## 2022-12-06 NOTE — HISTORY OF PRESENT ILLNESS
[TextBox_4] : Follow-up for abnormal chest CT cavitary lesion noted.  Sputum specimens ordered.  Telephone visit to discuss results\par \par No change in clinical status no fever or chills some cough.\par \par See results below

## 2022-12-09 RX ORDER — OMEPRAZOLE 20 MG/1
20 CAPSULE, DELAYED RELEASE ORAL
Qty: 30 | Refills: 0 | Status: DISCONTINUED | COMMUNITY
Start: 2017-01-31 | End: 2022-12-09

## 2022-12-09 RX ORDER — CIPROFLOXACIN HYDROCHLORIDE 500 MG/1
500 TABLET, FILM COATED ORAL
Qty: 2 | Refills: 0 | Status: DISCONTINUED | COMMUNITY
Start: 2022-06-07

## 2022-12-09 RX ORDER — SULFAMETHOXAZOLE AND TRIMETHOPRIM 800; 160 MG/1; MG/1
800-160 TABLET ORAL TWICE DAILY
Qty: 10 | Refills: 0 | Status: DISCONTINUED | COMMUNITY
Start: 2018-04-23 | End: 2022-12-09

## 2022-12-09 RX ORDER — DULOXETINE HYDROCHLORIDE 20 MG/1
20 CAPSULE, DELAYED RELEASE PELLETS ORAL
Qty: 30 | Refills: 0 | Status: DISCONTINUED | COMMUNITY
Start: 2021-12-02 | End: 2022-12-09

## 2022-12-09 RX ORDER — CEPHALEXIN 500 MG/1
500 TABLET ORAL EVERY 8 HOURS
Qty: 30 | Refills: 0 | Status: DISCONTINUED | COMMUNITY
Start: 2020-09-02 | End: 2022-12-09

## 2022-12-09 RX ORDER — CEFPROZIL 250 MG/5ML
250 POWDER, FOR SUSPENSION ORAL
Qty: 1 | Refills: 0 | Status: DISCONTINUED | COMMUNITY
Start: 2022-09-28 | End: 2022-12-09

## 2022-12-09 RX ORDER — NIRMATRELVIR AND RITONAVIR 300-100 MG
20 X 150 MG & KIT ORAL
Qty: 30 | Refills: 0 | Status: DISCONTINUED | COMMUNITY
Start: 2022-09-16

## 2022-12-09 RX ORDER — HYDROCORTISONE 25 MG/G
2.5 CREAM TOPICAL
Qty: 30 | Refills: 0 | Status: COMPLETED | COMMUNITY
Start: 2022-10-06

## 2022-12-09 RX ORDER — ESTRADIOL 0.1 MG/G
0.1 CREAM VAGINAL
Qty: 4 | Refills: 3 | Status: DISCONTINUED | COMMUNITY
Start: 2018-04-18 | End: 2022-12-09

## 2022-12-09 RX ORDER — ESOMEPRAZOLE MAGNESIUM 20 MG/1
20 CAPSULE, DELAYED RELEASE ORAL
Qty: 30 | Refills: 0 | Status: DISCONTINUED | COMMUNITY
Start: 2017-05-27 | End: 2022-12-09

## 2022-12-09 RX ORDER — ESCITALOPRAM OXALATE 10 MG/1
10 TABLET ORAL
Qty: 90 | Refills: 0 | Status: COMPLETED | COMMUNITY
Start: 2022-10-07

## 2022-12-13 ENCOUNTER — NON-APPOINTMENT (OUTPATIENT)
Age: 77
End: 2022-12-13

## 2022-12-14 ENCOUNTER — APPOINTMENT (OUTPATIENT)
Dept: PULMONOLOGY | Facility: CLINIC | Age: 77
End: 2022-12-14

## 2022-12-14 VITALS
DIASTOLIC BLOOD PRESSURE: 73 MMHG | HEIGHT: 61 IN | SYSTOLIC BLOOD PRESSURE: 111 MMHG | BODY MASS INDEX: 15.48 KG/M2 | OXYGEN SATURATION: 96 % | WEIGHT: 82 LBS | HEART RATE: 73 BPM

## 2022-12-14 PROCEDURE — 99214 OFFICE O/P EST MOD 30 MIN: CPT

## 2022-12-15 ENCOUNTER — APPOINTMENT (OUTPATIENT)
Dept: RHEUMATOLOGY | Facility: CLINIC | Age: 77
End: 2022-12-15

## 2022-12-15 ENCOUNTER — NON-APPOINTMENT (OUTPATIENT)
Age: 77
End: 2022-12-15

## 2022-12-15 LAB
ALBUMIN SERPL ELPH-MCNC: 3.9 G/DL
ALP BLD-CCNC: 66 U/L
ALT SERPL-CCNC: 13 U/L
ANION GAP SERPL CALC-SCNC: 13 MMOL/L
AST SERPL-CCNC: 22 U/L
BASOPHILS # BLD AUTO: 0.03 K/UL
BASOPHILS NFR BLD AUTO: 0.3 %
BILIRUB SERPL-MCNC: 0.4 MG/DL
BUN SERPL-MCNC: 12 MG/DL
CALCIUM SERPL-MCNC: 9.9 MG/DL
CHLORIDE SERPL-SCNC: 96 MMOL/L
CO2 SERPL-SCNC: 25 MMOL/L
CREAT SERPL-MCNC: 0.71 MG/DL
EGFR: 87 ML/MIN/1.73M2
EOSINOPHIL # BLD AUTO: 0.28 K/UL
EOSINOPHIL NFR BLD AUTO: 3.1 %
GLUCOSE SERPL-MCNC: 65 MG/DL
HCT VFR BLD CALC: 38.1 %
HGB BLD-MCNC: 12.3 G/DL
IMM GRANULOCYTES NFR BLD AUTO: 0.3 %
LYMPHOCYTES # BLD AUTO: 1.97 K/UL
LYMPHOCYTES NFR BLD AUTO: 22.1 %
MAN DIFF?: NORMAL
MCHC RBC-ENTMCNC: 30.7 PG
MCHC RBC-ENTMCNC: 32.3 GM/DL
MCV RBC AUTO: 95 FL
MONOCYTES # BLD AUTO: 0.59 K/UL
MONOCYTES NFR BLD AUTO: 6.6 %
NEUTROPHILS # BLD AUTO: 6.03 K/UL
NEUTROPHILS NFR BLD AUTO: 67.6 %
PLATELET # BLD AUTO: 216 K/UL
POTASSIUM SERPL-SCNC: 4.8 MMOL/L
PROT SERPL-MCNC: 6.7 G/DL
RBC # BLD: 4.01 M/UL
RBC # FLD: 15.2 %
SODIUM SERPL-SCNC: 133 MMOL/L
WBC # FLD AUTO: 8.93 K/UL

## 2022-12-15 PROCEDURE — 99214 OFFICE O/P EST MOD 30 MIN: CPT | Mod: 95

## 2022-12-15 NOTE — PHYSICAL EXAM
[No Acute Distress] : no acute distress [Normal Oropharynx] : normal oropharynx [Normal Appearance] : normal appearance [No Neck Mass] : no neck mass [Normal Rate/Rhythm] : normal rate/rhythm [Normal S1, S2] : normal s1, s2 [No Murmurs] : no murmurs [No Abnormalities] : no abnormalities [Benign] : benign [Normal Gait] : normal gait [No Cyanosis] : no cyanosis [FROM] : FROM [Normal Color/ Pigmentation] : normal color/ pigmentation [No Focal Deficits] : no focal deficits [Oriented x3] : oriented x3 [Normal Affect] : normal affect [TextBox_2] : Frail-appearing female [TextBox_68] : Bilateral crackles group home up [TextBox_105] : Marked joint changes and deviation of joints and fingers

## 2022-12-15 NOTE — HISTORY OF PRESENT ILLNESS
[TextBox_4] : Follow-up for abnormal chest CT cavitary lesion noted.  Sputum specimens ordered.  Cultures came back here to discuss results.  Continues to report poor appetite generalized malaise and some cough.

## 2022-12-15 NOTE — ASSESSMENT
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 \par \par -- f/u in [facility] on janaury 11 - live and in person at 12:15 - please let eulalio know \par \par complicated situation in high risk patient\par \par # mycobacterium kansasii - cavitary lung \par discussed with irasema and  at length - she is quite nervous about any and all changes to her medical regimen but currently understands why it is important to take it and says she will take the abx \par -- abx per pulmonary \par -- given level of arthritis - will try to keep the mtx and orencia for now - but may need to hold this - will confirm with pulmonary\par \par #pulnonary fibrosis\par crackles on chest exam during exam - breathing well on RA. cxr with fibrotic changes.  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  r/b/a discussed - they decline for now and are very anxious about switching anything \par -- no change in therapy until the infection is treated\par \par #hyponatremia\par irasema quite anxioud over this \par -- encouraged her to f/u with her PCP\par \par #RA.   \par Erosive end-state, damage.   previosuly on TNF-inh and currently on MTX/orencia which has helped for pain -   improvement in pain with increased mtx though not adherent to the regimen 2/2 anxiety.   Joints with pain from degenerative sequela rather than acute inflammatory changes.  \par -- can take take advil/alieve prn pain \par -- currently taking the methotrexate 6 tabs every week - declines going to 8 tabs\par -- continue the orencia for now. \par -- d/w patietn and hsuband switch in therapy - given the pulmonary fibrosis - eg rtx - r/b/a discussed - will hold off on any changes or increased immunosuppression until the infection is resolved\par -- continuing with podiatry - needs new orthotic but medicaid doesn’t pay \par \par #chronic pain.  multifactorial\par doing better this month  didn't tolerate Cymbalta or meloxicam \par -- observe for now\par \par #OA\par multiple jonits - liekly 2/2 previous inflamoary arhtritis - now with xray with GH OA \par -- observe for now\par #GERD.  in setting of HH\par - paitent feels the folate is resulting in this\par - however GERD is chronic and has tried many medications through the years\par - advised going back to the GI doc - perhaps an EGD\par - patient can try to limit the folic acid and reassess the symptoms\par \par #Anxiety. \par -- declining paxel from PCP\par -- psychology sessions seem to be going well - continue for now\par \par #OP\par -- on boniva since 2009 - taking a holiday (stopped 2021)\par -- DEXA\par -- endo referral\par \par #medical monitoring - with side effect / v58.69\par -- oral ulcer likely 2/2 mtx dose increase - increase folic acid to 2 tabs daily\par -- labs reviewed with patient from earlier in the week\par -- Quant Negative - June 2022\par -- hepatitis negative - June 2022\par \par #Barriers to care.\par -- patient anxious with blood drawers and doctors visit\par --  is supportive and sole caregiver\par \par \par #covid \par was recovering from covid - september b/w with neg NC - though pershasp was too soon for this reposne vs muted response 2/2 current immunopsuppression \par -- rech \par More than 50% of the encounter was spent counseling the patient on differential, workup, disease course and treatment/management.  Education was provided to the patient during this encounter.  All questions and concerns were addressed and answered.   The patient verbalized understanding and agreed to the plan. \par \par Patient has been instructed to call for an appointment if new symptoms develop.\par Patient has been instructed to make a followup appointment in 3 months.\par \par Time spent on the encounter included, but is not limited to, preparing to see the patient, obtaining and/or reviewing separately obtained history, performing the evaluation, counseling and educating, independently interpreting results with communication to patient, order placement, referring and/or communicating with other health professionals as described, and documenting clinical information in the electronic health record\par  \par \par

## 2022-12-15 NOTE — HISTORY OF PRESENT ILLNESS
[Home] : at home, [unfilled] , at the time of the visit. [Other Location: e.g. Home (Enter Location, City,State)___] : at [unfilled] [Verbal consent obtained from patient] : the patient, [unfilled] [None] : The patient is currently asymptomatic [FreeTextEntry1] : Ms. Najera has a PMHx of erosive RA, dementia here for f/u\par \par presenting hx\par \par # RA - developed at age 25.  Was severe at the beginning with inflammation.  Initially was treated in [place] with years of steroids and antiinflammatory only.  [place] the [place] in [place].  Remembers being on Enbrel, Humira and most recently methotrexate and Orencia (SQ).    Has had both knees and hip replaced at this point.  mother had RA as well.   Currently denies inflammation - there is no morning stiffness and her joints don’t get swollen or red any longer.  She has little pain in her joints except for those that have dislocated and subluxed.  Her biggest limitation and source of pain currently is her elbows (both) and her left shoulder.  She also gets low back pain.  all these symptoms are chronic for at least a year.\par \par #joint replacement hx\par -1996 bkr in [facility] \par -2003 right hip replacement but wasn’t able to get exact size but a larger hip was used -\par -2007 replaced left\par -2010 right hip revision with a femur\par -2014 right knee revision (nagi renee)\par \par #OP - unsure of last bone density - has been on monthly boniva.  \par \par INTERVAL Hx\par appreciate pulmonary evaluation - with cavitary changes and positive m kansai[place].  rec abx though through d/w dr finley patient very anxious about this.   \par - patient understands the importance of taking the abx - started to take them \par - today the rest of them arrive \par \par - will start ethambutol after the optho evisit - that will be done on tuesday \par

## 2022-12-15 NOTE — ASSESSMENT
[FreeTextEntry1] : Cavitary lung lesion in patient with rheumatoid arthritis.  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  May consider adding ethambutol anyway once ophthalmology evaluation concluded.\par \par Extensive review of drug interactions performed do not anticipate issues with any of her current rheumatoid arthritis medications although rifampin is a notorious inducer of drug metabolism and higher doses of treatments may be required.

## 2022-12-17 ENCOUNTER — NON-APPOINTMENT (OUTPATIENT)
Age: 77
End: 2022-12-17

## 2022-12-18 ENCOUNTER — NON-APPOINTMENT (OUTPATIENT)
Age: 77
End: 2022-12-18

## 2022-12-21 ENCOUNTER — APPOINTMENT (OUTPATIENT)
Dept: INFECTIOUS DISEASE | Facility: CLINIC | Age: 77
End: 2022-12-21
Payer: MEDICARE

## 2022-12-21 VITALS
DIASTOLIC BLOOD PRESSURE: 70 MMHG | OXYGEN SATURATION: 99 % | BODY MASS INDEX: 16.24 KG/M2 | HEIGHT: 61 IN | TEMPERATURE: 97.7 F | RESPIRATION RATE: 14 BRPM | HEART RATE: 84 BPM | SYSTOLIC BLOOD PRESSURE: 108 MMHG | WEIGHT: 86 LBS

## 2022-12-21 PROCEDURE — 99204 OFFICE O/P NEW MOD 45 MIN: CPT

## 2022-12-24 LAB — FUNGUS SPT CULT: NORMAL

## 2022-12-31 NOTE — ASSESSMENT
[FreeTextEntry1] : \par \par This is a 76 yo F who presents today for Cavitary lung lesion and sputum positive for M. [place]ii.  \par \par She has pmh of pulmonary fibrosis, anxiety, RA, spinal stenosis, GERD, who is on orencia and metotrexate and folic acid for the RA.\par \par She is hesitant for treatment but has begun azithro 250 mg daily and rifampin 450 mg daily as of 12/14/22. \par Labs were recently checked by her pulmonologist.\par She will check CBC and cmp 12/28.\par Check sputum each visit. Given cup to bring home.\par She will get ophthal eval for the possibility of adding ethambutol.\par Will need 3rd drug.\par Check sensitivity on isolate.\par Encouragement and reassurance provided to pt.\par Seems reasonable to continue her immunosuppression for RA.  Will monitory pulmonary status closely. \par \par RTC 1 month\par Time spent 45 minutes

## 2022-12-31 NOTE — PHYSICAL EXAM
[General Appearance - Alert] : alert [Sclera] : the sclera and conjunctiva were normal [PERRL With Normal Accommodation] : pupils were equal in size, round, reactive to light [Outer Ear] : the ears and nose were normal in appearance [Neck Appearance] : the appearance of the neck was normal [] : no respiratory distress [Auscultation Breath Sounds / Voice Sounds] : lungs were clear to auscultation bilaterally [Heart Rate And Rhythm] : heart rate was normal and rhythm regular [Heart Sounds] : normal S1 and S2 [Edema] : there was no peripheral edema [Bowel Sounds] : normal bowel sounds [Abdomen Soft] : soft [Skin Lesions] : no skin lesions [No Focal Deficits] : no focal deficits [Oriented To Time, Place, And Person] : oriented to person, place, and time [FreeTextEntry1] : tearful at times

## 2022-12-31 NOTE — CONSULT LETTER
[Dear  ___] : Dear  [unfilled], [Consult Letter:] : I had the pleasure of evaluating your patient, [unfilled]. [Please see my note below.] : Please see my note below. [Consult Closing:] : Thank you very much for allowing me to participate in the care of this patient.  If you have any questions, please do not hesitate to contact me. [Sincerely,] : Sincerely, [FreeTextEntry2] : Dr. Diego Henry [FreeTextEntry3] : \par Alida Rod MD\par  of Medicine\par Division of Infectious Diseases\par [employer] [facility]\par [address] [place] [address] DrElisha\[place] [address]\par Tel: [phone]\par Fax: [phone] [DrElisha  ___] : Dr. HERNÁNDEZ

## 2022-12-31 NOTE — HISTORY OF PRESENT ILLNESS
[FreeTextEntry1] : This is a 78 yo F who presents today for Cavitary lung lesion and sputum positive for M. [place]ii.  \par \par She has pmh of anxiety, RA, spinal stenosis, GERD, who is on orencia and metotrexate and folic acid for the RA.\par \par She was referred from her pulmonologist to help with medication management of the Mycobacteria kansasii infection. \par \par She has fatigue, malaise, and cough with sputum production.\par She has anxiety and is anxious about this new diagnosis of NTM infection the potential side effects of the medications. \par \par She was prescribed azithromycin 250 mg daily and rifampin 450 mg po daily to start on 12/14/22. \par Sputum cultures: 11/25/22, 11/28/22, 11/29/22: M kansasii\par CT chest 11/16/22:  Pulmonary fibrosis consistent with UIP pattern, increased at lung bases from 1/2016.  RLL consolidation and cystic lucency with air fluid level may be infectious.  Nodular and heterogenous thyroid gland.    There is traction bronchiectasis b/l lower lobes. \par \par She is having some difficulty tolerating the azithro/rifampin but meds just started 4 days ago.  She has some GI upset and overall has anxiety from this and is tearful throughout the visit.  helpful in keeping her calm and focused on the visit.

## 2022-12-31 NOTE — REVIEW OF SYSTEMS
[Feeling Tired] : feeling tired [Cough] : cough [Sputum] : coughing up ~M sputum [Negative] : Heme/Lymph

## 2023-01-06 LAB
BASOPHILS # BLD AUTO: 0.04 K/UL
BASOPHILS NFR BLD AUTO: 0.5 %
EOSINOPHIL # BLD AUTO: 0.26 K/UL
EOSINOPHIL NFR BLD AUTO: 3.1 %
HCT VFR BLD CALC: 35.8 %
HGB BLD-MCNC: 12.2 G/DL
IMM GRANULOCYTES NFR BLD AUTO: 0.4 %
LYMPHOCYTES # BLD AUTO: 2.05 K/UL
LYMPHOCYTES NFR BLD AUTO: 24.2 %
MAN DIFF?: NORMAL
MCHC RBC-ENTMCNC: 31.2 PG
MCHC RBC-ENTMCNC: 34.1 GM/DL
MCV RBC AUTO: 91.6 FL
MONOCYTES # BLD AUTO: 0.59 K/UL
MONOCYTES NFR BLD AUTO: 7 %
NEUTROPHILS # BLD AUTO: 5.49 K/UL
NEUTROPHILS NFR BLD AUTO: 64.8 %
PLATELET # BLD AUTO: 265 K/UL
RBC # BLD: 3.91 M/UL
RBC # FLD: 15 %
WBC # FLD AUTO: 8.46 K/UL

## 2023-01-09 ENCOUNTER — NON-APPOINTMENT (OUTPATIENT)
Age: 78
End: 2023-01-09

## 2023-01-09 LAB
ALBUMIN SERPL ELPH-MCNC: 3.8 G/DL
ALP BLD-CCNC: 77 U/L
ALT SERPL-CCNC: 67 U/L
ANION GAP SERPL CALC-SCNC: 12 MMOL/L
AST SERPL-CCNC: 54 U/L
BILIRUB SERPL-MCNC: 0.6 MG/DL
BUN SERPL-MCNC: 10 MG/DL
CALCIUM SERPL-MCNC: 9.3 MG/DL
CHLORIDE SERPL-SCNC: 94 MMOL/L
CO2 SERPL-SCNC: 22 MMOL/L
CREAT SERPL-MCNC: 0.58 MG/DL
EGFR: 93 ML/MIN/1.73M2
GLUCOSE SERPL-MCNC: 120 MG/DL
POTASSIUM SERPL-SCNC: 4.4 MMOL/L
PROT SERPL-MCNC: 6.7 G/DL
SODIUM SERPL-SCNC: 128 MMOL/L

## 2023-01-11 ENCOUNTER — APPOINTMENT (OUTPATIENT)
Dept: RHEUMATOLOGY | Facility: CLINIC | Age: 78
End: 2023-01-11
Payer: MEDICARE

## 2023-01-11 ENCOUNTER — APPOINTMENT (OUTPATIENT)
Dept: PULMONOLOGY | Facility: CLINIC | Age: 78
End: 2023-01-11
Payer: MEDICARE

## 2023-01-11 VITALS
DIASTOLIC BLOOD PRESSURE: 54 MMHG | HEIGHT: 61 IN | SYSTOLIC BLOOD PRESSURE: 101 MMHG | BODY MASS INDEX: 15.11 KG/M2 | WEIGHT: 80 LBS | HEART RATE: 77 BPM | OXYGEN SATURATION: 100 %

## 2023-01-11 PROCEDURE — 99214 OFFICE O/P EST MOD 30 MIN: CPT | Mod: 25

## 2023-01-11 PROCEDURE — 99215 OFFICE O/P EST HI 40 MIN: CPT | Mod: 25

## 2023-01-11 PROCEDURE — 71046 X-RAY EXAM CHEST 2 VIEWS: CPT

## 2023-01-11 NOTE — HISTORY OF PRESENT ILLNESS
[TextBox_4] : Prior: Follow-up for abnormal chest CT cavitary lesion noted.  Sputum specimens ordered.  Cultures came back here to discuss results.  Continues to report poor appetite generalized malaise and some cough.\par \par Current: 1 month into treatment for Mycobacterium [place].  Currently on rifampin and azithromycin.  Tolerating well.  Looks better than at last visit some decrease in cough.  Seen by infectious disease.  Had ophthalmology evaluation as well

## 2023-01-11 NOTE — PHYSICAL EXAM
[General Appearance - Alert] : alert [General Appearance - In No Acute Distress] : in no acute distress [Oriented To Time, Place, And Person] : oriented to person, place, and time [Impaired Insight] : insight and judgment were intact [Affect] : the affect was normal [Abnormal Walk] : normal gait [Nail Clubbing] : no clubbing  or cyanosis of the fingernails [Musculoskeletal - Swelling] : no joint swelling seen [Motor Tone] : muscle strength and tone were normal [FreeTextEntry1] : no syvnotiis [Skin Color & Pigmentation] : normal skin color and pigmentation [Skin Turgor] : normal skin turgor [] : no rash

## 2023-01-11 NOTE — ASSESSMENT
[FreeTextEntry1] : Cavitary lung lesion in patient with rheumatoid arthritis.  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  Needs follow-up LFTs will be drawn by rheumatologist today.  Check labs in a.m. we will coordinate ongoing care with infectious disease.

## 2023-01-11 NOTE — ASSESSMENT
[FreeTextEntry1] : Ms Stephanie has a PMHx OP, Anxiety, chronic pain and severe deforming RA now with subluxation of multiple joints and s/p bilateral knee and hip replacements. \par \par complicated situation in high risk patient\par \par # mycobacterium kansasii - cavitary lung \par discussed with bogdantent and  at length - she is quite nervous about any and all changes to her medical regimen but currently understands why it is important to take it and says she will take the abx \par -- given level of arthritis - will try to keep the mtx and orencia for now - but may need to hold this - will confirm with pulmonary\par -- cxr improved slightly today - d/w pulmonary on abx and doing a bit better\par \par #pulnonary fibrosis\par crackles on chest exam during exam - breathing well on RA. cxr with fibrotic changes.  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  r/b/a discussed - they decline for now and are very anxious about switching anything \par -- no change in therapy until the infection is treated\par -- d/w ppulmonary \par \par #hyponatremia\par irasema quite anxioud over this \par -- encouraged her to f/u with her PCP\par \par #RA.   \par Erosive end-state, damage.   previosuly on TNF-inh and currently on MTX/orencia which has helped for pain -   improvement in pain with increased mtx though not adherent to the regimen 2/2 anxiety.   Joints with pain from degenerative sequela rather than acute inflammatory changes.  \par -- can take take advil/alieve prn pain \par -- currently taking the methotrexate 6 tabs every week - declines going to 8 tabs\par -- continue the orencia for now. \par -- d/w patietn and hsuband switch in therapy - given the pulmonary fibrosis - eg rtx - r/b/a discussed - will hold off on any changes or increased immunosuppression until the infection is resolved\par -- given no synvoitis on exam may start to taper the MTX a bit and follow closely - if gerber is able to do b/w and visit more frequently - \par \par #weight loss \par down about 4-5 pounds.  she stopped her ensure because it makes her feel like she has to go to the bathroom and soemtimes cannot make it there.  doestn want to go to the urologies.   difficulty walking 2/2 her arthritis \par -- support given for the situation \par -- will try to reintroduce a little bit of ensure with whipped cream on top \par \par #chronic pain.  multifactorial\par doing better this month  didn't tolerate Cymbalta or meloxicam \par -- observe for now\par \par #OA\par multiple jonits - liekly 2/2 previous inflamoary arhtritis - now with xray with GH OA \par -- observe for now\par #GERD.  in setting of HH\par - paitent feels the folate is resulting in this\par - however GERD is chronic and has tried many medications through the years\par - advised going back to the GI doc - perhaps an EGD\par - patient can try to limit the folic acid and reassess the symptoms\par \par #Anxiety. \par -- declining paxel from PCP\par -- psychology sessions seem to be going well - continue for now\par \par #OP\par -- on boniva since 2009 - taking a holiday (stopped 2021)\par -- DEXA\par -- endo referral\par \par #medical monitoring - with side effect / v58.69\par -- oral ulcer likely 2/2 mtx dose increase - increase folic acid to 2 tabs daily\par -- labs reviewed with patient from earlier in the week\par -- Quant Negative - June 2022\par -- hepatitis negative - June 2022\par \par #Barriers to care.\par -- patient anxious with blood drawers and doctors visit\par --  is supportive and sole caregiver\par \par \par #covid \par was recovering from covid - september b/w with neg NC - though pershasp was too soon for this reposne vs muted response 2/2 current immunopsuppression \par -- rech \par \par More than 50% of the encounter was spent counseling the patient on differential, workup, disease course and treatment/management.  Education was provided to the patient during this encounter.  All questions and concerns were addressed and answered.   The patient verbalized understanding and agreed to the plan. \par \par Patient has been instructed to call for an appointment if new symptoms develop.\par Patient has been instructed to make a followup appointment in 3 months.\par \par Time spent on the encounter included, but is not limited to, preparing to see the patient, obtaining and/or reviewing separately obtained history, performing the evaluation, counseling and educating, independently interpreting results with communication to patient, order placement, referring and/or communicating with other health professionals as described, and documenting clinical information in the electronic health record\par  \par \par

## 2023-01-12 ENCOUNTER — NON-APPOINTMENT (OUTPATIENT)
Age: 78
End: 2023-01-12

## 2023-01-12 LAB
ALBUMIN SERPL ELPH-MCNC: 3.7 G/DL
ALP BLD-CCNC: 78 U/L
ALT SERPL-CCNC: 44 U/L
ANION GAP SERPL CALC-SCNC: 10 MMOL/L
AST SERPL-CCNC: 29 U/L
BASOPHILS # BLD AUTO: 0.05 K/UL
BASOPHILS NFR BLD AUTO: 0.6 %
BILIRUB SERPL-MCNC: 0.5 MG/DL
BUN SERPL-MCNC: 11 MG/DL
CALCIUM SERPL-MCNC: 10.3 MG/DL
CHLORIDE SERPL-SCNC: 93 MMOL/L
CO2 SERPL-SCNC: 25 MMOL/L
CREAT SERPL-MCNC: 0.59 MG/DL
CRP SERPL-MCNC: 7 MG/L
EGFR: 92 ML/MIN/1.73M2
EOSINOPHIL # BLD AUTO: 0.35 K/UL
EOSINOPHIL NFR BLD AUTO: 4.2 %
ERYTHROCYTE [SEDIMENTATION RATE] IN BLOOD BY WESTERGREN METHOD: 84 MM/HR
HCT VFR BLD CALC: 37.2 %
HGB BLD-MCNC: 12.2 G/DL
IMM GRANULOCYTES NFR BLD AUTO: 0.4 %
LYMPHOCYTES # BLD AUTO: 2.16 K/UL
LYMPHOCYTES NFR BLD AUTO: 25.7 %
MAN DIFF?: NORMAL
MCHC RBC-ENTMCNC: 31.4 PG
MCHC RBC-ENTMCNC: 32.8 GM/DL
MCV RBC AUTO: 95.9 FL
MONOCYTES # BLD AUTO: 0.62 K/UL
MONOCYTES NFR BLD AUTO: 7.4 %
NEUTROPHILS # BLD AUTO: 5.2 K/UL
NEUTROPHILS NFR BLD AUTO: 61.7 %
PLATELET # BLD AUTO: 235 K/UL
POTASSIUM SERPL-SCNC: 4.8 MMOL/L
PROT SERPL-MCNC: 7.1 G/DL
RBC # BLD: 3.88 M/UL
RBC # FLD: 15.2 %
SODIUM SERPL-SCNC: 128 MMOL/L
WBC # FLD AUTO: 8.41 K/UL

## 2023-01-19 LAB — ACID FAST STN SPT: ABNORMAL

## 2023-01-20 LAB
ACID FAST STN SPT: ABNORMAL
ACID FAST STN SPT: ABNORMAL

## 2023-01-30 ENCOUNTER — APPOINTMENT (OUTPATIENT)
Dept: INFECTIOUS DISEASE | Facility: CLINIC | Age: 78
End: 2023-01-30

## 2023-03-01 LAB
BASOPHILS # BLD AUTO: 0.04 K/UL
BASOPHILS NFR BLD AUTO: 0.4 %
EOSINOPHIL # BLD AUTO: 0.29 K/UL
EOSINOPHIL NFR BLD AUTO: 3.1 %
HCT VFR BLD CALC: 39.1 %
HGB BLD-MCNC: 13.1 G/DL
IMM GRANULOCYTES NFR BLD AUTO: 0.3 %
LYMPHOCYTES # BLD AUTO: 2.07 K/UL
LYMPHOCYTES NFR BLD AUTO: 21.8 %
MAN DIFF?: NORMAL
MCHC RBC-ENTMCNC: 32.3 PG
MCHC RBC-ENTMCNC: 33.5 GM/DL
MCV RBC AUTO: 96.3 FL
MONOCYTES # BLD AUTO: 0.56 K/UL
MONOCYTES NFR BLD AUTO: 5.9 %
NEUTROPHILS # BLD AUTO: 6.51 K/UL
NEUTROPHILS NFR BLD AUTO: 68.5 %
PLATELET # BLD AUTO: 234 K/UL
RBC # BLD: 4.06 M/UL
RBC # FLD: 14.6 %
WBC # FLD AUTO: 9.5 K/UL

## 2023-03-02 LAB
ALBUMIN SERPL ELPH-MCNC: 4.1 G/DL
ALP BLD-CCNC: 67 U/L
ALT SERPL-CCNC: 18 U/L
ANION GAP SERPL CALC-SCNC: 12 MMOL/L
AST SERPL-CCNC: 24 U/L
BILIRUB SERPL-MCNC: 0.5 MG/DL
BUN SERPL-MCNC: 10 MG/DL
CALCIUM SERPL-MCNC: 10 MG/DL
CHLORIDE SERPL-SCNC: 95 MMOL/L
CO2 SERPL-SCNC: 22 MMOL/L
CREAT SERPL-MCNC: 0.62 MG/DL
CRP SERPL-MCNC: 5 MG/L
EGFR: 91 ML/MIN/1.73M2
ERYTHROCYTE [SEDIMENTATION RATE] IN BLOOD BY WESTERGREN METHOD: 38 MM/HR
GLUCOSE SERPL-MCNC: 137 MG/DL
POTASSIUM SERPL-SCNC: 4.2 MMOL/L
PROT SERPL-MCNC: 7 G/DL
SODIUM SERPL-SCNC: 129 MMOL/L

## 2023-03-08 ENCOUNTER — APPOINTMENT (OUTPATIENT)
Dept: RHEUMATOLOGY | Facility: CLINIC | Age: 78
End: 2023-03-08
Payer: MEDICARE

## 2023-03-08 ENCOUNTER — APPOINTMENT (OUTPATIENT)
Dept: PULMONOLOGY | Facility: CLINIC | Age: 78
End: 2023-03-08
Payer: MEDICARE

## 2023-03-08 VITALS
HEART RATE: 76 BPM | BODY MASS INDEX: 15.11 KG/M2 | OXYGEN SATURATION: 97 % | DIASTOLIC BLOOD PRESSURE: 80 MMHG | HEIGHT: 61 IN | WEIGHT: 80 LBS | SYSTOLIC BLOOD PRESSURE: 125 MMHG

## 2023-03-08 VITALS — BODY MASS INDEX: 15.87 KG/M2 | WEIGHT: 84 LBS

## 2023-03-08 PROCEDURE — 99214 OFFICE O/P EST MOD 30 MIN: CPT

## 2023-03-08 PROCEDURE — 99213 OFFICE O/P EST LOW 20 MIN: CPT

## 2023-03-08 NOTE — HISTORY OF PRESENT ILLNESS
[TextBox_4] : Prior: Follow-up for abnormal chest CT cavitary lesion noted.  Sputum specimens ordered.  Cultures came back here to discuss results.  Continues to report poor appetite generalized malaise and some cough.\par \par Current: 3 month into treatment for Mycobacterium [place].  Currently on rifampin, azithromycin and ethambutol.  Generally feels better looks much stronger.  Appetite good some persistent cough

## 2023-03-08 NOTE — PHYSICAL EXAM
[No Acute Distress] : no acute distress [Normal Oropharynx] : normal oropharynx [Normal Appearance] : normal appearance [No Neck Mass] : no neck mass [Normal Rate/Rhythm] : normal rate/rhythm [Normal S1, S2] : normal s1, s2 [No Murmurs] : no murmurs [No Abnormalities] : no abnormalities [Benign] : benign [Normal Gait] : normal gait [No Cyanosis] : no cyanosis [FROM] : FROM [Normal Color/ Pigmentation] : normal color/ pigmentation [No Focal Deficits] : no focal deficits [Oriented x3] : oriented x3 [Normal Affect] : normal affect [TextBox_2] : Frail-appearing female [TextBox_68] : Bilateral crackles correction up [TextBox_105] : Marked joint changes and deviation of joints and fingers

## 2023-03-08 NOTE — ASSESSMENT
[FreeTextEntry1] : Cavitary lung lesion in patient with rheumatoid arthritis.  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

## 2023-03-10 ENCOUNTER — APPOINTMENT (OUTPATIENT)
Dept: CT IMAGING | Facility: CLINIC | Age: 78
End: 2023-03-10
Payer: MEDICARE

## 2023-03-10 ENCOUNTER — OUTPATIENT (OUTPATIENT)
Dept: OUTPATIENT SERVICES | Facility: HOSPITAL | Age: 78
LOS: 1 days | End: 2023-03-10
Payer: COMMERCIAL

## 2023-03-10 DIAGNOSIS — E87.1 HYPO-OSMOLALITY AND HYPONATREMIA: ICD-10-CM

## 2023-03-10 DIAGNOSIS — Z84.89 FAMILY HISTORY OF OTHER SPECIFIED CONDITIONS: Chronic | ICD-10-CM

## 2023-03-10 DIAGNOSIS — Z00.8 ENCOUNTER FOR OTHER GENERAL EXAMINATION: ICD-10-CM

## 2023-03-10 PROCEDURE — 71250 CT THORAX DX C-: CPT | Mod: 26

## 2023-03-10 PROCEDURE — 71250 CT THORAX DX C-: CPT

## 2023-03-10 NOTE — QUALITY MEASURES
[Screened for TB within the past 12] : patient screened for TB within the past 12 months [Offered a DMARD for rheumatoid] : patient offered a DMARD for rheumatoid arthritis

## 2023-03-14 NOTE — PHYSICAL EXAM
[General Appearance - Alert] : alert [General Appearance - In No Acute Distress] : in no acute distress [Abnormal Walk] : normal gait [Nail Clubbing] : no clubbing  or cyanosis of the fingernails [Musculoskeletal - Swelling] : no joint swelling seen [Motor Tone] : muscle strength and tone were normal [Skin Color & Pigmentation] : normal skin color and pigmentation [Skin Turgor] : normal skin turgor [] : no rash [Oriented To Time, Place, And Person] : oriented to person, place, and time [Impaired Insight] : insight and judgment were intact [Affect] : the affect was normal [FreeTextEntry1] : no syvnotiis

## 2023-03-14 NOTE — ASSESSMENT
[FreeTextEntry1] : Ms Brown has a PMHx OP, Anxiety, chronic pain and severe deforming RA now with subluxation of multiple joints and s/p bilateral knee and hip replacements. \par \par complicated situation in high risk patient\par \par # mycobacterium kansasii - cavitary lung \par discussed with irasema and  at length - she is quite nervous about any and all changes to her medical regimen but currently understands why it is important to take it and says she will take the abx \par -- cxr improved slightly today - d/w pulmonary on abx and doing a bit better\par -- d/w Dr. Henry - getting a CT\par \par #pulnonary fibrosis\par crackles on chest exam during exam - breathing well on RA. cxr with fibrotic changes.  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  r/b/a discussed - they decline for now and are very anxious about switching anything \par -- no change in therapy until the infection is treated\par \par #hyponatremia\par irasema quite anxioud over this \par -- encouraged her to f/u with her PCP\par \par #RA.   \par Erosive end-state, damage.   previosuly on TNF-inh and currently on MTX/orencia which has helped for pain -   improvement in pain with increased mtx though not adherent to the regimen 2/2 anxiety.   Joints with pain from degenerative sequela rather than acute inflammatory changes.  \par -- can take take advil/alieve prn pain \par -- currently taking the methotrexate 6 tabs every week - declines going to 8 tabs\par -- continue the Orencia for now. \par -- given no synvoitis on exam may start to taper the MTX a bit and follow closely - if gerber is able to do b/w and visit more frequently - \par \par #weight loss \par down about 4-5 pounds.  she stopped her ensure because it makes her feel like she has to go to the bathroom and soemtimes cannot make it there.  doestn want to go to the urologies.   difficulty walking 2/2 her arthritis \par -- support given for the situation \par -- will try to reintroduce a little bit of ensure with whipped cream on top - which seemed to wrok last month \par -- support given\par \par #chronic pain.  multifactorial\par doing better this month  didn't tolerate Cymbalta or meloxicam \par -- observe for now\par \par #OA\par multiple jonits - liekly 2/2 previous inflamoary arhtritis - now with xray with GH OA \par -- observe for now\par #GERD.  in setting of HH\par - paitent feels the folate is resulting in this\par - however GERD is chronic and has tried many medications through the years\par - advised going back to the GI doc - perhaps an EGD\par - patient can try to limit the folic acid and reassess the symptoms\par \par #Anxiety. \par -- declining paxel from PCP\par -- psychology sessions seem to be going well - continue for now\par \par #OP\par -- on boniva since 2009 - taking a holiday (stopped 2021)\par -- DEXA\par -- endo referral\par \par #medical monitoring - with side effect / v58.69\par -- oral ulcer likely 2/2 mtx dose increase - increase folic acid to 2 tabs daily\par -- labs reviewed with patient from earlier in the week\par -- Quant Negative - June 2022\par -- hepatitis negative - June 2022\par \par #Barriers to care.\par -- patient anxious with blood drawers and doctors visit\par --  is supportive and sole caregiver\par \par \par #covid \par was recovering from covid - september b/w with neg NC - though pershasp was too soon for this reposne vs muted response 2/2 current immunopsuppression \par -- rech \par \par More than 50% of the encounter was spent counseling the patient on differential, workup, disease course and treatment/management.  Education was provided to the patient during this encounter.  All questions and concerns were addressed and answered.   The patient verbalized understanding and agreed to the plan. \par \par Patient has been instructed to call for an appointment if new symptoms develop.\par Patient has been instructed to make a followup appointment in 3 months.\par \par Time spent on the encounter included, but is not limited to, preparing to see the patient, obtaining and/or reviewing separately obtained history, performing the evaluation, counseling and educating, independently interpreting results with communication to patient, order placement, referring and/or communicating with other health professionals as described, and documenting clinical information in the electronic health record\par  \par \par

## 2023-03-14 NOTE — HISTORY OF PRESENT ILLNESS
[None] : The patient is currently asymptomatic [FreeTextEntry1] : Ms. Najera has a PMHx of erosive RA, dementia here for f/u\par \par presenting hx\par \par # RA - developed at age 25.  Was severe at the beginning with inflammation.  Initially was treated in [place] with years of steroids and antiinflammatory only.  [place] the [place] in [place].  Remembers being on Enbrel, Humira and most recently methotrexate and Orencia (SQ).    Has had both knees and hip replaced at this point.  mother had RA as well.   Currently denies inflammation - there is no morning stiffness and her joints don’t get swollen or red any longer.  She has little pain in her joints except for those that have dislocated and subluxed.  Her biggest limitation and source of pain currently is her elbows (both) and her left shoulder.  She also gets low back pain.  all these symptoms are chronic for at least a year.\par \par #joint replacement hx\par -1996 bkr in [facility] \par -2003 right hip replacement but wasn’t able to get exact size but a larger hip was used -\par -2007 replaced left\par -2010 right hip revision with a femur\par -2014 right knee revision (nagi renee)\par \par #OP - unsure of last bone density - has been on monthly boniva.  \par \par INTERVAL Hx\par appreciate pulmonary evaluation - d/w dr finley with patient and  in the room \par going to have a CT\par joints are okay \par gaining wegith on the ensure\par

## 2023-03-15 ENCOUNTER — TRANSCRIPTION ENCOUNTER (OUTPATIENT)
Age: 78
End: 2023-03-15

## 2023-03-16 ENCOUNTER — NON-APPOINTMENT (OUTPATIENT)
Age: 78
End: 2023-03-16

## 2023-03-20 ENCOUNTER — APPOINTMENT (OUTPATIENT)
Dept: PULMONOLOGY | Facility: CLINIC | Age: 78
End: 2023-03-20
Payer: MEDICARE

## 2023-03-20 PROCEDURE — 99442: CPT | Mod: 95

## 2023-03-20 NOTE — HISTORY OF PRESENT ILLNESS
[TextBox_4] : Patient called because of complaint of nausea with rifampin.  Currently taking triple antibiotic therapy for Mycobacterium Kansasii\par Started treatment with azithromycin and rifampin in December and ethambutol added in January.  Generally tolerating therapy well having increasing issues with nausea which she associates timing with rifampin she takes the other medications in the evening and she gets nauseated after taking the rifampin.  Recent lab work has been performed and reviewed.

## 2023-03-20 NOTE — ASSESSMENT
[FreeTextEntry1] : Mycobacterium Kansasii infection.  3 months into treatment with improvement in radiographic findings.  Have asked patient to hold off on rifampin for 1 week.  We will discuss with infectious disease regarding duration of treatment.  Follow-up imaging shows improvement and patient is not producing sputum.  Need to also discuss whether at this point to drug therapy may be adequate\par \par Repeat labs ordered.\par If resumption and rifampin therapy is necessary and liver enzymes are stable may consider adding Zofran for symptom control.\par \par \par Time 12 minutes\par

## 2023-03-20 NOTE — REASON FOR VISIT
[Home] : at home, [unfilled] , at the time of the visit. [Medical Office: ([facility])___] : at the medical office located in  [Spouse] : spouse [Verbal consent obtained from patient] : the patient, [unfilled]

## 2023-04-02 ENCOUNTER — NON-APPOINTMENT (OUTPATIENT)
Age: 78
End: 2023-04-02

## 2023-04-03 ENCOUNTER — LABORATORY RESULT (OUTPATIENT)
Age: 78
End: 2023-04-03

## 2023-04-03 ENCOUNTER — APPOINTMENT (OUTPATIENT)
Dept: INTERNAL MEDICINE | Facility: CLINIC | Age: 78
End: 2023-04-03
Payer: MEDICARE

## 2023-04-03 VITALS
WEIGHT: 88 LBS | HEART RATE: 71 BPM | DIASTOLIC BLOOD PRESSURE: 73 MMHG | OXYGEN SATURATION: 99 % | SYSTOLIC BLOOD PRESSURE: 118 MMHG | RESPIRATION RATE: 12 BRPM | BODY MASS INDEX: 16.62 KG/M2 | HEIGHT: 61 IN

## 2023-04-03 PROCEDURE — 99204 OFFICE O/P NEW MOD 45 MIN: CPT | Mod: 25

## 2023-04-03 PROCEDURE — 36415 COLL VENOUS BLD VENIPUNCTURE: CPT

## 2023-04-03 RX ORDER — LOTEPREDNOL ETABONATE 5 MG/G
0.5 OINTMENT OPHTHALMIC
Refills: 0 | Status: DISCONTINUED | COMMUNITY
Start: 2022-12-09 | End: 2023-04-03

## 2023-04-03 RX ORDER — RIFAMPIN 150 MG/1
150 CAPSULE ORAL DAILY
Qty: 90 | Refills: 1 | Status: DISCONTINUED | COMMUNITY
Start: 2022-12-14 | End: 2023-04-03

## 2023-04-03 NOTE — PHYSICAL EXAM
[No Acute Distress] : no acute distress [Thyroid Normal, No Nodules] : the thyroid was normal and there were no nodules present [No Respiratory Distress] : no respiratory distress  [No Accessory Muscle Use] : no accessory muscle use [Normal Rate] : normal rate  [Regular Rhythm] : with a regular rhythm [Normal S1, S2] : normal S1 and S2 [No Edema] : there was no peripheral edema [de-identified] : Very thin [de-identified] : Bilateral coarse crackles

## 2023-04-03 NOTE — HISTORY OF PRESENT ILLNESS
[FreeTextEntry8] : Josh today as a new patient.  She has extensive rheumatoid arthritis with multiple joint deformities/poor mobility, osteoporosis, and OCD.  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  She is here today to establish care and with the following concerns:\par \par 1.  Hyponatremia\par Longstanding history of hyponatremia stable between high 120s and low 130s.  She drinks about 6 glasses of water per day.  She never had nephrology evaluation.  Reports that she was on 1 time on a medication for anxiety/OCD, though made the sodium worse and it was discontinued.  Reports dark stool, though no bright red blood per rectum.\par \par 2.  Dysphagia/GERD\par Ports longstanding history, greater than 5 years, of reflux and difficulty swallowing.  Reports both pain and difficulty with swallowing.  Solids are worse than liquids.  Her  manages most of her food for her.  She has a difficult time with some pills.  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  He has never had GI work-up.\par \par 3.  OCD/anxiety\par She is very anxious and hesitant to change medication. [Spouse] : spouse

## 2023-04-03 NOTE — REVIEW OF SYSTEMS
[Fever] : no fever [Chills] : no chills [Fatigue] : no fatigue [Night Sweats] : no night sweats [Chest Pain] : no chest pain [Palpitations] : no palpitations [Shortness Of Breath] : no shortness of breath [Wheezing] : no wheezing [Cough] : no cough [Abdominal Pain] : no abdominal pain [Nausea] : nausea [Constipation] : no constipation [Diarrhea] : diarrhea [Vomiting] : no vomiting [Heartburn] : heartburn [Melena] : no melena [Dysuria] : no dysuria [Hematuria] : no hematuria

## 2023-04-03 NOTE — ASSESSMENT
[FreeTextEntry1] : 1.  Hyponatremia\par Send CMP, serum osmolality, triglycerides, light chains, SPEP, random ACTH and cortisol, TFTs\par Check UA, urine osmolality, urine sodium, urine creatinine\par Continue current hydration status\par \par 2.  Dysphagia/GERD\par Hesitant to switch from famotidine to PPI.  Will increase famotidine to 40 mg daily.\par  H. pylori from stool, the results may be confounded from famotidine use\par Send stool FIT\par Barium swallow\par We will likely need GI\par \par She will return in 2 to 3 weeks.  She would benefit from pharmacotherapy for her OCD/anxiety, though she is hesitant and her hyponatremia needs to be sorted out first.\par

## 2023-04-04 ENCOUNTER — LABORATORY RESULT (OUTPATIENT)
Age: 78
End: 2023-04-04

## 2023-04-10 ENCOUNTER — NON-APPOINTMENT (OUTPATIENT)
Age: 78
End: 2023-04-10

## 2023-04-10 PROBLEM — E87.1 HYPONATREMIA: Status: RESOLVED | Noted: 2023-04-03 | Resolved: 2023-04-10

## 2023-04-11 ENCOUNTER — LABORATORY RESULT (OUTPATIENT)
Age: 78
End: 2023-04-11

## 2023-04-11 ENCOUNTER — NON-APPOINTMENT (OUTPATIENT)
Age: 78
End: 2023-04-11

## 2023-04-11 LAB
ACTH-ESO: 65 PG/ML
ALBUMIN MFR SERPL ELPH: 58.5 %
ALBUMIN SERPL ELPH-MCNC: 4.2 G/DL
ALBUMIN SERPL-MCNC: 3.9 G/DL
ALBUMIN/GLOB SERPL: 1.4 RATIO
ALP BLD-CCNC: 56 U/L
ALPHA1 GLOB MFR SERPL ELPH: 3.6 %
ALPHA1 GLOB SERPL ELPH-MCNC: 0.2 G/DL
ALPHA2 GLOB MFR SERPL ELPH: 9.7 %
ALPHA2 GLOB SERPL ELPH-MCNC: 0.6 G/DL
ALT SERPL-CCNC: 10 U/L
ANION GAP SERPL CALC-SCNC: 11 MMOL/L
APPEARANCE: CLEAR
AST SERPL-CCNC: 24 U/L
B-GLOBULIN MFR SERPL ELPH: 10.5 %
B-GLOBULIN SERPL ELPH-MCNC: 0.7 G/DL
BILIRUB SERPL-MCNC: 0.3 MG/DL
BILIRUBIN URINE: NEGATIVE
BLOOD URINE: NEGATIVE
BUN SERPL-MCNC: 13 MG/DL
CALCIUM SERPL-MCNC: 9.9 MG/DL
CHLORIDE SERPL-SCNC: 92 MMOL/L
CO2 SERPL-SCNC: 24 MMOL/L
COLOR: NORMAL
CORTIS SERPL-MCNC: 11.3 UG/DL
CREAT SERPL-MCNC: 0.64 MG/DL
CREAT SPEC-SCNC: 16 MG/DL
DEPRECATED KAPPA LC FREE/LAMBDA SER: 1.87 RATIO
EGFR: 90 ML/MIN/1.73M2
GAMMA GLOB FLD ELPH-MCNC: 1.2 G/DL
GAMMA GLOB MFR SERPL ELPH: 17.7 %
GLUCOSE QUALITATIVE U: NEGATIVE
GLUCOSE SERPL-MCNC: 97 MG/DL
H PYLORI AG STL QL: NEGATIVE
HCT VFR BLD CALC: 36.3 %
HGB BLD-MCNC: 11.7 G/DL
INTERPRETATION SERPL IEP-IMP: NORMAL
KAPPA LC CSF-MCNC: 2.9 MG/DL
KAPPA LC SERPL-MCNC: 5.42 MG/DL
KETONES URINE: NEGATIVE
LEUKOCYTE ESTERASE URINE: NEGATIVE
M PROTEIN MFR SERPL ELPH: NORMAL
MCHC RBC-ENTMCNC: 31.8 PG
MCHC RBC-ENTMCNC: 32.2 GM/DL
MCV RBC AUTO: 98.6 FL
MONOCLON BAND OBS SERPL: NORMAL
NITRITE URINE: NEGATIVE
OSMOLALITY SERPL: 276 MOSMOL/KG
OSMOLALITY UR: 148 MOSM/KG
PH URINE: 7
PLATELET # BLD AUTO: 211 K/UL
POTASSIUM SERPL-SCNC: 4.9 MMOL/L
PROT SERPL-MCNC: 6.6 G/DL
PROTEIN URINE: NEGATIVE
RBC # BLD: 3.68 M/UL
RBC # FLD: 14.3 %
SODIUM ?TM SUB UR QN: <20 MMOL/L
SODIUM SERPL-SCNC: 128 MMOL/L
SPECIFIC GRAVITY URINE: 1.01
T4 FREE SERPL-MCNC: 1.8 NG/DL
TRIGL SERPL-MCNC: 86 MG/DL
TSH SERPL-ACNC: 0.21 UIU/ML
UROBILINOGEN URINE: NORMAL
WBC # FLD AUTO: 8.74 K/UL

## 2023-04-14 ENCOUNTER — TRANSCRIPTION ENCOUNTER (OUTPATIENT)
Age: 78
End: 2023-04-14

## 2023-04-14 LAB
ALBUMIN SERPL ELPH-MCNC: 4.1 G/DL
ALP BLD-CCNC: 51 U/L
ALT SERPL-CCNC: 8 U/L
ANION GAP SERPL CALC-SCNC: 12 MMOL/L
AST SERPL-CCNC: 20 U/L
BILIRUB SERPL-MCNC: 0.6 MG/DL
BUN SERPL-MCNC: 9 MG/DL
CALCIUM SERPL-MCNC: 9.9 MG/DL
CHLORIDE SERPL-SCNC: 94 MMOL/L
CO2 SERPL-SCNC: 23 MMOL/L
CREAT SERPL-MCNC: 0.67 MG/DL
EGFR: 89 ML/MIN/1.73M2
GLUCOSE SERPL-MCNC: 122 MG/DL
HCT VFR BLD CALC: 37.3 %
HGB BLD-MCNC: 12.5 G/DL
MCHC RBC-ENTMCNC: 32.1 PG
MCHC RBC-ENTMCNC: 33.5 GM/DL
MCV RBC AUTO: 95.9 FL
PLATELET # BLD AUTO: 214 K/UL
POTASSIUM SERPL-SCNC: 4.6 MMOL/L
PROT SERPL-MCNC: 6.6 G/DL
RBC # BLD: 3.89 M/UL
RBC # FLD: 14.3 %
SODIUM SERPL-SCNC: 129 MMOL/L
WBC # FLD AUTO: 7.19 K/UL

## 2023-04-19 ENCOUNTER — APPOINTMENT (OUTPATIENT)
Dept: PULMONOLOGY | Facility: CLINIC | Age: 78
End: 2023-04-19
Payer: MEDICARE

## 2023-04-19 ENCOUNTER — RX RENEWAL (OUTPATIENT)
Age: 78
End: 2023-04-19

## 2023-04-19 VITALS
HEIGHT: 61 IN | OXYGEN SATURATION: 99 % | DIASTOLIC BLOOD PRESSURE: 68 MMHG | WEIGHT: 89 LBS | SYSTOLIC BLOOD PRESSURE: 109 MMHG | BODY MASS INDEX: 16.8 KG/M2 | HEART RATE: 76 BPM

## 2023-04-19 PROCEDURE — 71046 X-RAY EXAM CHEST 2 VIEWS: CPT

## 2023-04-19 PROCEDURE — 99213 OFFICE O/P EST LOW 20 MIN: CPT | Mod: 25

## 2023-04-19 NOTE — ASSESSMENT
[FreeTextEntry1] : Cavitary lung lesion in patient with rheumatoid arthritis.  Not really producing sputum at this point.  Generally feels much better and there has been significant radiographic improvement both by chest CT and by chest x-ray.  It does appear that she is not on what I would call an approved regimen-as she is not on the rifampin.  However she has done so well with treatment I am not anxious to restart a whole new regimen I suggest we do 1 month additional treatment and then do a CT scan and see where we stand from that perspective

## 2023-04-19 NOTE — HISTORY OF PRESENT ILLNESS
[Never] : never [TextBox_4] : Prior: Follow-up for abnormal chest CT cavitary lesion noted.  Sputum specimens ordered.  Cultures came back here to discuss results.  Continues to report poor appetite generalized malaise and some cough.\par \par Current: 4 month into treatment for Mycobacterium Kansasi.  Currently on, azithromycin and ethambutol.  Rifampin discontinued because of intolerance.  Cough much better not losing weight appetite good saw ophthalmologist-had a more detailed evaluation.\par \par

## 2023-04-19 NOTE — PHYSICAL EXAM
[No Acute Distress] : no acute distress [Normal Oropharynx] : normal oropharynx [Normal Appearance] : normal appearance [No Neck Mass] : no neck mass [Normal Rate/Rhythm] : normal rate/rhythm [Normal S1, S2] : normal s1, s2 [No Murmurs] : no murmurs [No Abnormalities] : no abnormalities [Benign] : benign [Normal Gait] : normal gait [No Cyanosis] : no cyanosis [FROM] : FROM [Normal Color/ Pigmentation] : normal color/ pigmentation [No Focal Deficits] : no focal deficits [Oriented x3] : oriented x3 [Normal Affect] : normal affect [TextBox_2] : Frail-appearing female [TextBox_68] : Bilateral crackles MCFP up [TextBox_105] : Marked joint changes and deviation of joints and fingers

## 2023-04-19 NOTE — PROCEDURE
[FreeTextEntry1] : Sensitivities on Mycobacterium Kansasi from [facility] Sensitivity noted to clarithromycin linezolid rifabutin and rifampin ethambutol not tested.

## 2023-04-25 ENCOUNTER — APPOINTMENT (OUTPATIENT)
Dept: INTERNAL MEDICINE | Facility: CLINIC | Age: 78
End: 2023-04-25
Payer: MEDICARE

## 2023-04-25 ENCOUNTER — NON-APPOINTMENT (OUTPATIENT)
Age: 78
End: 2023-04-25

## 2023-04-25 VITALS — DIASTOLIC BLOOD PRESSURE: 65 MMHG | HEART RATE: 77 BPM | SYSTOLIC BLOOD PRESSURE: 108 MMHG | OXYGEN SATURATION: 97 %

## 2023-04-25 DIAGNOSIS — E87.1 HYPO-OSMOLALITY AND HYPONATREMIA: ICD-10-CM

## 2023-04-25 DIAGNOSIS — R19.5 OTHER FECAL ABNORMALITIES: ICD-10-CM

## 2023-04-25 LAB — ACTH-ESO: 69 PG/ML

## 2023-04-25 PROCEDURE — 99214 OFFICE O/P EST MOD 30 MIN: CPT

## 2023-04-25 NOTE — ASSESSMENT
[FreeTextEntry1] : 1.  Hyperthyroidism\par Check thyroid-stimulating immunoglobulins, thyroid sono\par Needs endocrine eval\par \par 2. ?  Mild adrenal insufficiency.\par ACTH is elevated, though cortisol levels have been normal\par Needs to see endocrine for the hyperthyroidism regardless\par \par 3.  Hyponatremia\par We will ultimately need nephrology eval.  Sodium is stable\par \par 4.  Positive fecal occult blood\par Has appointment with GI\par \par 5.  Monoclonal gammopathy\par Hematology eval\par \par Follow-up 3 months

## 2023-04-25 NOTE — HISTORY OF PRESENT ILLNESS
[Spouse] : spouse [FreeTextEntry1] : lab review  [de-identified] : Ivelisse presents today for follow-up.  She is here with her .  Her labs were reviewed with her.  They are notable for chronic stable hyponatremia at 128.  Her thyroid function also shows signs of hypothyroidism.  Her  reports that she had a history of abnormal thyroid function testing in the past and was transiently on treatment, does not member which type.  Her cortisol level is also technically in normal range, though her ACTH is high.  Immunofixation showed weak IgG kappa band.\par \par Anxiety and OCD remain severe.  May interfere with her ability to follow through with care.

## 2023-04-28 ENCOUNTER — OUTPATIENT (OUTPATIENT)
Dept: OUTPATIENT SERVICES | Facility: HOSPITAL | Age: 78
LOS: 1 days | End: 2023-04-28
Payer: COMMERCIAL

## 2023-04-28 ENCOUNTER — APPOINTMENT (OUTPATIENT)
Dept: CT IMAGING | Facility: CLINIC | Age: 78
End: 2023-04-28
Payer: MEDICARE

## 2023-04-28 DIAGNOSIS — Z84.89 FAMILY HISTORY OF OTHER SPECIFIED CONDITIONS: Chronic | ICD-10-CM

## 2023-04-28 DIAGNOSIS — J84.10 PULMONARY FIBROSIS, UNSPECIFIED: ICD-10-CM

## 2023-04-28 DIAGNOSIS — M06.9 RHEUMATOID ARTHRITIS, UNSPECIFIED: ICD-10-CM

## 2023-04-28 DIAGNOSIS — A31.0 PULMONARY MYCOBACTERIAL INFECTION: ICD-10-CM

## 2023-04-28 PROCEDURE — 71250 CT THORAX DX C-: CPT | Mod: 26

## 2023-04-28 PROCEDURE — 71250 CT THORAX DX C-: CPT

## 2023-05-04 ENCOUNTER — TRANSCRIPTION ENCOUNTER (OUTPATIENT)
Age: 78
End: 2023-05-04

## 2023-05-06 LAB — TSI ACT/NOR SER: <0.1 IU/L

## 2023-05-08 NOTE — ASSESSMENT
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 \par \par - anxiety is high - doc aleisha paroxetene\par - not taking paroxetene\par - will start cymbatla 20 mg daily\par \par - boniva on once  monthly - \par \par #RA.   Erosive end-state \par - improvement in pain with increased mtx - although now with oral ulcers\par - inflammatory markers a bit up though chronic changes on exam\par - joints cool and not inflamed today - ? burnt out\par - continue orencia and methotrexate for now \par - would like to inc mtx given inflammatory markers and improvement from first dose escalation (suggesting that disease is not burnt out) - but cannot at this time given inc oral ulcers\par \par #chronic pain.  multifactorial\par - meloxicam didn’t help as much as alieve - dc meloxicam\par \par #Anxiety. \par - declining paxel from PCP\par - discussed r/b/a of cymbalta - as this may help both anxiety and chronic pain - r/b/a discussed - will consider\par \par #OP\par - on boniva - refill\par #shoulder and elbows\par -main disability is in the joints that are subluxed elbows and shoulder.   \par -shoulder and foot ankle ortho on board\par \par #hyponatremia\par - discussed at length with patient anid  - needs followup with internist\par - is taking in less free water\par \par #medical monitoring - with side effect / v58.69\par -oral ulcer likely 2/2 mtx dose increase - increase folic acid to 2 tabs daily\par - labs reviewed with patient from earlier in the week\par - Quant Negative - May 2019 
Opt out

## 2023-05-10 ENCOUNTER — APPOINTMENT (OUTPATIENT)
Dept: GASTROENTEROLOGY | Facility: CLINIC | Age: 78
End: 2023-05-10
Payer: MEDICARE

## 2023-05-10 VITALS
HEART RATE: 128 BPM | HEIGHT: 61 IN | DIASTOLIC BLOOD PRESSURE: 73 MMHG | WEIGHT: 90 LBS | SYSTOLIC BLOOD PRESSURE: 113 MMHG | OXYGEN SATURATION: 97 % | RESPIRATION RATE: 14 BRPM | BODY MASS INDEX: 16.99 KG/M2

## 2023-05-10 PROCEDURE — 99204 OFFICE O/P NEW MOD 45 MIN: CPT

## 2023-05-10 NOTE — HISTORY OF PRESENT ILLNESS
[FreeTextEntry1] : Dr. Jo takes care of this pleasant 77-year-old female\par \par Here with her \par \par She has OCD\par \par She has a lung infection being treated\par \par She is lost a significant amount of weight over 5 years\par \par She is cachectic\par \par She describes some vague sense of dysphagia to both liquids and solids at times\par \par No cough or aspiration\par \par No odynophagia\par \par Sometimes some epigastric with diffuse abdominal discomfort\par \par She has a recent Hemoccult positive stool\par \par No change in bowel habits or visible blood per rectum\par \par She is a very poor candidate for upper endoscopy or colonoscopy given her very debilitated condition, she is in a wheelchair, she is weak and febrile, and she has a lung infection and is being treated

## 2023-05-10 NOTE — PHYSICAL EXAM
[Alert] : alert [Normal Voice/Communication] : normal voice/communication [No Acute Distress] : no acute distress [Sclera] : the sclera and conjunctiva were normal [Hearing Threshold Finger Rub Not [place]] : hearing was normal [Normal Appearance] : the appearance of the neck was normal [No Respiratory Distress] : no respiratory distress [No Acc Muscle Use] : no accessory muscle use [Respiration, Rhythm And Depth] : normal respiratory rhythm and effort [Heart Rate And Rhythm] : heart rate was normal and rhythm regular [None] : no edema [Abdomen Tenderness] : non-tender [Bowel Sounds] : normal bowel sounds [No Masses] : no abdominal mass palpated [Abdomen Soft] : soft [] : no hepatosplenomegaly [No Focal Deficits] : no focal deficits [Oriented To Time, Place, And Person] : oriented to person, place, and time [de-identified] : Cachectic, in a wheelchair, weak and febrile [de-identified] : Unable to stand without assistance

## 2023-05-10 NOTE — REASON FOR VISIT
[Initial Evaluation] : an initial evaluation [FreeTextEntry1] : Weight loss, dysphagia, heartburn, Hemoccult positive stool

## 2023-05-10 NOTE — ASSESSMENT
[FreeTextEntry1] : Impression\par \par Significant weight loss over 5 years\par \par Vague sense of dysphagia\par \par Decline modified barium swallow test in the past\par \par Heartburn not being controlled with Pepcid\par \par Occasional abdominal discomfort\par \par Hemoccult positive stool\par \par In a patient with an active lung infection being treated by pulmonary\par \par Patient with OCD and abscesses with her medications and medical illnesses yet seems to be noncompliant with treatment modalities at times and diagnostic testing\par \par Suggest\par \par Trial of pantoprazole 40 mg a day since she is still having acid reflux symptoms\par \par She is not a candidate for both upper endoscopy or colonoscopy\par \par I have suggested barium esophagram and double contrast upper GI series\par \par I have suggested CAT scan virtual colonoscopy\par \par Follow-up after above\par \par Follow-up with pulmonary\par \par Follow-up with primary care provider\par \par She is welcome to second opinion, she seems to be somewhat uncomfortable with recommendations for pantoprazole, upper GI series and CAT scan virtual colonoscopy

## 2023-05-12 ENCOUNTER — OUTPATIENT (OUTPATIENT)
Dept: OUTPATIENT SERVICES | Facility: HOSPITAL | Age: 78
LOS: 1 days | End: 2023-05-12
Payer: COMMERCIAL

## 2023-05-12 DIAGNOSIS — R63.4 ABNORMAL WEIGHT LOSS: ICD-10-CM

## 2023-05-12 DIAGNOSIS — Z84.89 FAMILY HISTORY OF OTHER SPECIFIED CONDITIONS: Chronic | ICD-10-CM

## 2023-05-12 PROCEDURE — 74220 X-RAY XM ESOPHAGUS 1CNTRST: CPT | Mod: 26

## 2023-05-12 PROCEDURE — 74220 X-RAY XM ESOPHAGUS 1CNTRST: CPT

## 2023-05-16 ENCOUNTER — NON-APPOINTMENT (OUTPATIENT)
Age: 78
End: 2023-05-16

## 2023-05-19 ENCOUNTER — APPOINTMENT (OUTPATIENT)
Dept: CT IMAGING | Facility: CLINIC | Age: 78
End: 2023-05-19
Payer: MEDICARE

## 2023-05-19 ENCOUNTER — OUTPATIENT (OUTPATIENT)
Dept: OUTPATIENT SERVICES | Facility: HOSPITAL | Age: 78
LOS: 1 days | End: 2023-05-19
Payer: COMMERCIAL

## 2023-05-19 DIAGNOSIS — Z00.8 ENCOUNTER FOR OTHER GENERAL EXAMINATION: ICD-10-CM

## 2023-05-19 DIAGNOSIS — R63.4 ABNORMAL WEIGHT LOSS: ICD-10-CM

## 2023-05-19 DIAGNOSIS — Z84.89 FAMILY HISTORY OF OTHER SPECIFIED CONDITIONS: Chronic | ICD-10-CM

## 2023-05-19 PROCEDURE — 74261 CT COLONOGRAPHY DX: CPT

## 2023-05-19 PROCEDURE — 74261 CT COLONOGRAPHY DX: CPT | Mod: 26

## 2023-05-24 ENCOUNTER — APPOINTMENT (OUTPATIENT)
Dept: PULMONOLOGY | Facility: CLINIC | Age: 78
End: 2023-05-24
Payer: MEDICARE

## 2023-05-24 ENCOUNTER — NON-APPOINTMENT (OUTPATIENT)
Age: 78
End: 2023-05-24

## 2023-05-24 VITALS
BODY MASS INDEX: 15.29 KG/M2 | OXYGEN SATURATION: 100 % | HEART RATE: 77 BPM | WEIGHT: 81 LBS | HEIGHT: 61 IN | SYSTOLIC BLOOD PRESSURE: 134 MMHG | DIASTOLIC BLOOD PRESSURE: 80 MMHG

## 2023-05-24 PROCEDURE — 99213 OFFICE O/P EST LOW 20 MIN: CPT

## 2023-05-24 PROCEDURE — ZZZZZ: CPT

## 2023-05-24 RX ORDER — AZITHROMYCIN 250 MG/1
250 TABLET, FILM COATED ORAL DAILY
Qty: 1 | Refills: 5 | Status: DISCONTINUED | COMMUNITY
Start: 2022-12-14 | End: 2023-05-24

## 2023-05-24 RX ORDER — ETHAMBUTOL HYDROCHLORIDE 400 MG/1
400 TABLET ORAL
Qty: 90 | Refills: 0 | Status: DISCONTINUED | COMMUNITY
Start: 2023-01-17 | End: 2023-05-24

## 2023-05-24 NOTE — ASSESSMENT
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  At this point would discontinue antibiotic therapy.  Should continue on treatment for rheumatoid arthritis.  Probably not strong enough to be able to handle Esbriet or Ofev had enough difficulty tolerating the medications for the mycobacterial infection.  Has ongoing GI issues per GI note.  Recommend serial CT observation.  Unfortunately unable to do PFTs reliably to monitor her condition

## 2023-05-24 NOTE — PROCEDURE
[FreeTextEntry1] : Sensitivities on Mycobacterium Kansasi from [facility]\par Sensitivity noted to clarithromycin linezolid rifabutin and rifampin ethambutol not tested.\par \par PFT attempted could not obtain reliable data

## 2023-05-24 NOTE — HISTORY OF PRESENT ILLNESS
[Never] : never [TextBox_4] : Prior: Follow-up for abnormal chest CT cavitary lesion noted.  Sputum specimens ordered.  Cultures came back here to discuss results.  Continues to report poor appetite generalized malaise and some cough.\par \par Current: 5 month into treatment for Mycobacterium Kansasi.  Currently on, azithromycin and ethambutol.  Rifampin discontinued because of intolerance.  Cough much better not losing weight appetite good saw ophthalmologist-had a more detailed evaluation.\par \par

## 2023-05-24 NOTE — PHYSICAL EXAM
[No Acute Distress] : no acute distress [Normal Oropharynx] : normal oropharynx [Normal Appearance] : normal appearance [No Neck Mass] : no neck mass [Normal Rate/Rhythm] : normal rate/rhythm [Normal S1, S2] : normal s1, s2 [No Murmurs] : no murmurs [No Abnormalities] : no abnormalities [Benign] : benign [Normal Gait] : normal gait [No Cyanosis] : no cyanosis [FROM] : FROM [Normal Color/ Pigmentation] : normal color/ pigmentation [No Focal Deficits] : no focal deficits [Oriented x3] : oriented x3 [Normal Affect] : normal affect [TextBox_2] : Frail-appearing female [TextBox_68] : Bilateral crackles detention up [TextBox_105] : Marked joint changes and deviation of joints and fingers

## 2023-06-01 RX ORDER — PANTOPRAZOLE 40 MG/1
40 TABLET, DELAYED RELEASE ORAL DAILY
Qty: 30 | Refills: 5 | Status: DISCONTINUED | COMMUNITY
Start: 2023-05-10 | End: 2023-06-01

## 2023-06-02 ENCOUNTER — APPOINTMENT (OUTPATIENT)
Dept: NEPHROLOGY | Facility: CLINIC | Age: 78
End: 2023-06-02
Payer: MEDICARE

## 2023-06-02 VITALS
DIASTOLIC BLOOD PRESSURE: 60 MMHG | SYSTOLIC BLOOD PRESSURE: 124 MMHG | TEMPERATURE: 97.9 F | WEIGHT: 82 LBS | BODY MASS INDEX: 15.48 KG/M2 | HEIGHT: 61 IN

## 2023-06-02 PROCEDURE — 99205 OFFICE O/P NEW HI 60 MIN: CPT

## 2023-06-02 NOTE — ASSESSMENT
[FreeTextEntry1] : 76 yo woman with RA and hx of pulmonary Mycobacterium Kansasii.\par Review of labs in EMR c/w Chronic Hyponatremia.\par Etiology of Hyponatremia needs to be determined, but clinically c/w depletional hyponatremia.\par Will reassess with additional lab data and urine studies.\par Pt and  were instructed to maintain food, fluid and caloric intake record for review at next visit.\par Pt declined to followup in 2 weeks so compromised to 6 week follow up.\par

## 2023-06-02 NOTE — PHYSICAL EXAM
[General Appearance - Alert] : alert [General Appearance - In No Acute Distress] : in no acute distress [FreeTextEntry1] : Appears weak and frail.  noted for temporal wasting. [Sclera] : the sclera and conjunctiva were normal [PERRL With Normal Accommodation] : pupils were equal in size, round, and reactive to light [Neck Appearance] : the appearance of the neck was normal [] : the neck was supple [Auscultation Breath Sounds / Voice Sounds] : lungs were clear to auscultation bilaterally [Heart Sounds] : normal S1 and S2 [Edema] : there was no peripheral edema [Bowel Sounds] : normal bowel sounds [Abdomen Soft] : soft

## 2023-06-02 NOTE — HISTORY OF PRESENT ILLNESS
[FreeTextEntry1] : Ms. Brown presents for evaluation of Hyponatremia.\par 76 yo woman with PMHX of Rheumatoid Arthritis x 49 years and recent tx for Mycobacterium Kansasii pulmonary infection.\par Pt is referred for evaluation of Hyponatremia.\par pt's  reports he was aware of low sodium level for several years in the past but no Nephrology evaluation has been done.\par Pt asks repeatedly about her sodium level without answering my questions re : her po intake at home and her dietary habits.\par Pt appears anxious and uncomfortable.\par Most hx is provided by her .\par Pt has been losing weight steadily and  reports limited intake of food in general, especially protein.

## 2023-06-26 ENCOUNTER — TRANSCRIPTION ENCOUNTER (OUTPATIENT)
Age: 78
End: 2023-06-26

## 2023-06-28 ENCOUNTER — APPOINTMENT (OUTPATIENT)
Dept: PULMONOLOGY | Facility: CLINIC | Age: 78
End: 2023-06-28
Payer: MEDICARE

## 2023-06-28 VITALS
HEIGHT: 61 IN | BODY MASS INDEX: 15.73 KG/M2 | OXYGEN SATURATION: 100 % | DIASTOLIC BLOOD PRESSURE: 71 MMHG | HEART RATE: 77 BPM | WEIGHT: 83.31 LBS | SYSTOLIC BLOOD PRESSURE: 126 MMHG

## 2023-06-28 LAB
OSMOLALITY UR: 134 MOSM/KG
SODIUM ?TM SUB UR QN: 25 MMOL/L

## 2023-06-28 PROCEDURE — 99213 OFFICE O/P EST LOW 20 MIN: CPT | Mod: 25

## 2023-06-28 PROCEDURE — 71046 X-RAY EXAM CHEST 2 VIEWS: CPT

## 2023-06-28 NOTE — ASSESSMENT
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  At this point would discontinue antibiotic therapy.  Should continue on treatment for rheumatoid arthritis.  X-ray is stable short-term stopping medications.  Recommend 3-month follow-up chest CT unless symptoms change.\par \par Theoretically patient would be a candidate for antiproliferative treatment in the setting of autoimmune disease and interstitial lung disease.  However there are several impediments to this plan.  The first is her inability to do reliable PFTs the second is her general poor tolerance of medications.  At this point I would recommend continued observation if there is clear disease progression by CT we could reconsider the possibility of antiproliferative agent otherwise I would recommend observation alone

## 2023-06-28 NOTE — HISTORY OF PRESENT ILLNESS
[Never] : never [TextBox_4] : Prior: Follow-up for abnormal chest CT cavitary lesion noted.  Sputum specimens ordered.  Cultures came back here to discuss results.  Continues to report poor appetite generalized malaise and some cough.\par \par Current: Completed course of treatment for Mycobacterium Kansasii.  Has some intermittent cough.  Not short of breath.  No fever chills or sweats.

## 2023-06-28 NOTE — PHYSICAL EXAM
[No Acute Distress] : no acute distress [Normal Oropharynx] : normal oropharynx [Normal Appearance] : normal appearance [No Neck Mass] : no neck mass [Normal Rate/Rhythm] : normal rate/rhythm [Normal S1, S2] : normal s1, s2 [No Murmurs] : no murmurs [No Abnormalities] : no abnormalities [Benign] : benign [Normal Gait] : normal gait [No Cyanosis] : no cyanosis [FROM] : FROM [Normal Color/ Pigmentation] : normal color/ pigmentation [No Focal Deficits] : no focal deficits [Oriented x3] : oriented x3 [Normal Affect] : normal affect [TextBox_2] : Frail-appearing female [TextBox_68] : Bilateral crackles skilled nursing up [TextBox_105] : Marked joint changes and deviation of joints and fingers

## 2023-07-08 LAB
ALBUMIN SERPL ELPH-MCNC: 3.9 G/DL
ALP BLD-CCNC: 52 U/L
ALT SERPL-CCNC: 10 U/L
ANION GAP SERPL CALC-SCNC: 14 MMOL/L
AST SERPL-CCNC: 24 U/L
BILIRUB SERPL-MCNC: 0.4 MG/DL
BUN SERPL-MCNC: 10 MG/DL
CALCIUM SERPL-MCNC: 9.7 MG/DL
CHLORIDE SERPL-SCNC: 95 MMOL/L
CO2 SERPL-SCNC: 22 MMOL/L
CREAT SERPL-MCNC: 0.72 MG/DL
EGFR: 86 ML/MIN/1.73M2
ERYTHROCYTE [SEDIMENTATION RATE] IN BLOOD BY WESTERGREN METHOD: 37 MM/HR
HCT VFR BLD CALC: 34.7 %
HGB BLD-MCNC: 11.8 G/DL
MCHC RBC-ENTMCNC: 33.1 PG
MCHC RBC-ENTMCNC: 34 GM/DL
MCV RBC AUTO: 97.5 FL
PLATELET # BLD AUTO: 204 K/UL
POTASSIUM SERPL-SCNC: 4.3 MMOL/L
PROT SERPL-MCNC: 6.7 G/DL
RBC # BLD: 3.56 M/UL
RBC # FLD: 13.9 %
SODIUM SERPL-SCNC: 130 MMOL/L
WBC # FLD AUTO: 11.81 K/UL

## 2023-07-10 LAB — CRP SERPL-MCNC: <3 MG/L

## 2023-07-12 ENCOUNTER — APPOINTMENT (OUTPATIENT)
Dept: RHEUMATOLOGY | Facility: CLINIC | Age: 78
End: 2023-07-12
Payer: MEDICARE

## 2023-07-12 VITALS
HEIGHT: 61 IN | RESPIRATION RATE: 16 BRPM | OXYGEN SATURATION: 99 % | WEIGHT: 83 LBS | SYSTOLIC BLOOD PRESSURE: 78 MMHG | BODY MASS INDEX: 15.67 KG/M2 | HEART RATE: 70 BPM | DIASTOLIC BLOOD PRESSURE: 72 MMHG

## 2023-07-12 DIAGNOSIS — Z87.2 PERSONAL HISTORY OF DISEASES OF THE SKIN AND SUBCUTANEOUS TISSUE: ICD-10-CM

## 2023-07-12 DIAGNOSIS — R10.13 EPIGASTRIC PAIN: ICD-10-CM

## 2023-07-12 DIAGNOSIS — Z87.898 PERSONAL HISTORY OF OTHER SPECIFIED CONDITIONS: ICD-10-CM

## 2023-07-12 DIAGNOSIS — T84.84XA PAIN DUE TO INTERNAL ORTHOPEDIC PROSTHETIC DEVICES, IMPLANTS AND GRAFTS, INITIAL ENCOUNTER: ICD-10-CM

## 2023-07-12 DIAGNOSIS — R63.4 ABNORMAL WEIGHT LOSS: ICD-10-CM

## 2023-07-12 DIAGNOSIS — Z11.59 ENCOUNTER FOR SCREENING FOR OTHER VIRAL DISEASES: ICD-10-CM

## 2023-07-12 DIAGNOSIS — U07.1 COVID-19: ICD-10-CM

## 2023-07-12 LAB
ALBUMIN SERPL ELPH-MCNC: 3.8 G/DL
ANION GAP SERPL CALC-SCNC: 13 MMOL/L
BUN SERPL-MCNC: 11 MG/DL
CALCIUM SERPL-MCNC: 9.5 MG/DL
CHLORIDE SERPL-SCNC: 94 MMOL/L
CO2 SERPL-SCNC: 22 MMOL/L
CORTIS SERPL-MCNC: 10.8 UG/DL
CREAT SERPL-MCNC: 0.72 MG/DL
EGFR: 86 ML/MIN/1.73M2
GLUCOSE SERPL-MCNC: 113 MG/DL
OSMOLALITY SERPL: 275 MOSMOL/KG
PHOSPHATE SERPL-MCNC: 3.2 MG/DL
POTASSIUM SERPL-SCNC: 4.2 MMOL/L
SODIUM SERPL-SCNC: 130 MMOL/L
URATE SERPL-MCNC: 3.1 MG/DL

## 2023-07-12 PROCEDURE — 99214 OFFICE O/P EST MOD 30 MIN: CPT

## 2023-07-17 PROBLEM — Z11.59 SCREENING FOR VIRAL DISEASE: Status: RESOLVED | Noted: 2022-10-24 | Resolved: 2023-07-17

## 2023-07-17 PROBLEM — R63.4 UNINTENTIONAL WEIGHT LOSS: Status: RESOLVED | Noted: 2023-01-11 | Resolved: 2023-07-17

## 2023-07-17 PROBLEM — Z87.898 HISTORY OF ABNORMAL WEIGHT LOSS: Status: RESOLVED | Noted: 2023-05-10 | Resolved: 2023-07-17

## 2023-07-17 PROBLEM — Z87.2 HISTORY OF CELLULITIS: Status: RESOLVED | Noted: 2020-09-02 | Resolved: 2023-07-17

## 2023-07-17 PROBLEM — R10.13 ABDOMINAL DISCOMFORT, EPIGASTRIC: Status: RESOLVED | Noted: 2023-05-10 | Resolved: 2023-07-17

## 2023-07-17 PROBLEM — U07.1 COVID-19: Status: RESOLVED | Noted: 2022-09-28 | Resolved: 2023-07-17

## 2023-07-17 NOTE — HISTORY OF PRESENT ILLNESS
[None] : The patient is currently asymptomatic [FreeTextEntry1] : Ms. Najera has a PMHx of erosive RA, dementia here for f/u\par \par presenting hx\par \par # RA - developed at age 25.  Was severe at the beginning with inflammation.  Initially was treated in [place] with years of steroids and antiinflammatory only.  [place].  Remembers being on Enbrel, Humira and most recently methotrexate and Orencia (SQ).    Has had both knees and hip replaced at this point.  mother had RA as well.   Currently denies inflammation - there is no morning stiffness and her joints don’t get swollen or red any longer.  She has little pain in her joints except for those that have dislocated and subluxed.  Her biggest limitation and source of pain currently is her elbows (both) and her left shoulder.  She also gets low back pain.  all these symptoms are chronic for at least a year.\par \par #joint replacement hx\par -1996 bkr in [facility] \par -2003 right hip replacement but wasn’t able to get exact size but a larger hip was used -\par -2007 replaced left\par -2010 right hip revision with a femur\par -2014 right knee revision (nagi renee)\par \par #OP - unsure of last bone density - has been on monthly boniva.  \par \par INTERVAL Hx\par appreciate pulmonary evaluation\par all in all - better than before \par the feet and the orthotics remain largest issue - given the severity of the deformity - the orthotics are expensive and not many people do them.  it is a distance to go and get it done\par

## 2023-07-17 NOTE — CONSULT LETTER
[Dear  ___] : Dear  [unfilled], [Courtesy Letter:] : I had the pleasure of seeing your patient, [unfilled], in my office today. [Please see my note below.] : Please see my note below. [Consult Closing:] : Thank you very much for allowing me to participate in the care of this patient.  If you have any questions, please do not hesitate to contact me. [Sincerely,] : Sincerely, [DrElisha  ___] : Dr. HERNÁNDEZ [FreeTextEntry2] : rKysten Ng\par Pa Ramos

## 2023-07-17 NOTE — ASSESSMENT
[FreeTextEntry1] : Ms Stephanie has a PMHx OP, Anxiety, chronic pain and severe deforming RA now with subluxation of multiple joints and s/p bilateral knee and hip replacements. \par \par complicated situation in high risk patient\par \par # mycobacterium kansasii - cavitary lung \par doing well after abx treatment\par -- f/u pulmonary \par -- f/u CT in a few months\par \par # pulmonary fibrosis\par crackles on chest exam during exam - breathing well on RA. cxr with fibrotic changes.  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  r/b/a discussed - they decline for now and are very anxious about switching anything \par -- no change in therapy until the infection is treated - now stable - will monitor at present\par \par #hyponatremia\par paitent quite anxioud over this - now relatively stable but low\par -- encouraged her to f/u with her PCP and renal\par \par #RA.   \par Erosive end-state, damage.   previosuly on TNF-inh and currently on MTX/orencia which has helped for pain -   improvement in pain with increased mtx though not adherent to the regimen 2/2 anxiety.   Joints with pain from degenerative sequela rather than acute inflammatory changes.  \par -- can take take advil/alieve prn pain \par -- currently taking the methotrexate 6 tabs every week\par -- continue the Orencia for now. \par \par #chronic pain.  multifactorial\par doing better this month  didn't tolerate Cymbalta or meloxicam \par -- observe for now\par \par #OA\par multiple jonits - liekly 2/2 previous inflammatory arthritis - now with xray with GH OA \par -- observe for now\par #GERD.  in setting of HH\par - paitent feels the folate is resulting in this\par - however GERD is chronic and has tried many medications through the years\par - advised going back to the GI doc - perhaps an EGD\par - patient can try to limit the folic acid and reassess the symptoms\par \par #Anxiety. \par -- declining paxel from PCP\par -- psychology sessions seem to be going well - continue for now\par \par #OP\par -- on boniva since 2009 - taking a holiday (stopped 2021)\par -- DEXA\par -- endo referral\par \par #medical monitoring - with side effect / v58.69\par -- oral ulcer likely 2/2 mtx dose increase - increase folic acid to 2 tabs daily\par -- labs reviewed with patient from earlier in the week\par -- Quant Negative - June 2022\par -- hepatitis negative - June 2022\par \par #Barriers to care.\par -- patient anxious with blood drawers and doctors visit\par --  is supportive and sole caregiver\par \par \par #covid \par was recovering from covid - september b/w with neg NC - though pershasp was too soon for this reposne vs muted response 2/2 current immunopsuppression \par -- rech \par \par More than 50% of the encounter was spent counseling the patient on differential, workup, disease course and treatment/management.  Education was provided to the patient during this encounter.  All questions and concerns were addressed and answered.   The patient verbalized understanding and agreed to the plan. \par \par Patient has been instructed to call for an appointment if new symptoms develop.\par Patient has been instructed to make a followup appointment in 3 months.\par \par Time spent on the encounter included, but is not limited to, preparing to see the patient, obtaining and/or reviewing separately obtained history, performing the evaluation, counseling and educating, independently interpreting results with communication to patient, order placement, referring and/or communicating with other health professionals as described, and documenting clinical information in the electronic health record\par  \par \par

## 2023-07-19 ENCOUNTER — RX RENEWAL (OUTPATIENT)
Age: 78
End: 2023-07-19

## 2023-07-19 ENCOUNTER — APPOINTMENT (OUTPATIENT)
Dept: NEPHROLOGY | Facility: CLINIC | Age: 78
End: 2023-07-19
Payer: MEDICARE

## 2023-07-19 VITALS — HEIGHT: 61 IN | DIASTOLIC BLOOD PRESSURE: 70 MMHG | SYSTOLIC BLOOD PRESSURE: 118 MMHG | TEMPERATURE: 97.5 F

## 2023-07-19 PROCEDURE — 99214 OFFICE O/P EST MOD 30 MIN: CPT

## 2023-07-19 NOTE — HISTORY OF PRESENT ILLNESS
[FreeTextEntry1] : Ms. Brown presents for evaluation of Hyponatremia.\par No new events since last visit.\par Pt again appears anxious and fixates on her weight.\par States  "why is her abdomen bigger but her weight is low."\par Continues to struggle with eating and also exhibits displeasure with eating.\par

## 2023-07-19 NOTE — ASSESSMENT
[FreeTextEntry1] : 76 yo woman with RA and hx of pulmonary Mycobacterium Kansasii.\par \par --Chronic Hyponatremia : C/w depletional Hyponatremia.\par    NA : 130 (7/7/2023)  Ranges 128-133.\par    Urine osm ; 134,  Urine NA 25\par    Cortisol WNL\par    Uric acid : WNL 3.1.\par    Explained in detail to pt that she needs to focus on increasing protein and calorie intake.\par     instructed to add salt to food.\par    \par --Advised to consider Psychiatry evaluation and follow up for treatment of anxiety and OCD.\par    Pt had been prescribed rx but reports it did not agree with her.\par

## 2023-08-16 ENCOUNTER — APPOINTMENT (OUTPATIENT)
Dept: INTERNAL MEDICINE | Facility: CLINIC | Age: 78
End: 2023-08-16
Payer: MEDICARE

## 2023-08-16 VITALS
OXYGEN SATURATION: 98 % | WEIGHT: 83 LBS | DIASTOLIC BLOOD PRESSURE: 71 MMHG | HEIGHT: 61 IN | SYSTOLIC BLOOD PRESSURE: 117 MMHG | BODY MASS INDEX: 15.67 KG/M2 | HEART RATE: 71 BPM

## 2023-08-16 PROCEDURE — 99214 OFFICE O/P EST MOD 30 MIN: CPT

## 2023-08-16 NOTE — REVIEW OF SYSTEMS
[Abdominal Pain] : abdominal pain [Heartburn] : heartburn [Fever] : no fever [Chills] : no chills [Night Sweats] : no night sweats [Chest Pain] : no chest pain [Shortness Of Breath] : no shortness of breath [Headache] : no headache

## 2023-08-16 NOTE — PHYSICAL EXAM
[No Acute Distress] : no acute distress [Normal Sclera/Conjunctiva] : normal sclera/conjunctiva [No Respiratory Distress] : no respiratory distress  [No Accessory Muscle Use] : no accessory muscle use [Normal Rate] : normal rate  [Regular Rhythm] : with a regular rhythm [Normal S1, S2] : normal S1 and S2 [No Murmur] : no murmur heard [No Edema] : there was no peripheral edema [Soft] : abdomen soft [Non Tender] : non-tender [Non-distended] : non-distended [Normal Bowel Sounds] : normal bowel sounds [de-identified] : frail appearing [de-identified] : bibasilar crackles

## 2023-08-16 NOTE — ASSESSMENT
[FreeTextEntry1] : 1.  Abnormal TFTs Had endocrinology work-up. We will request records  2.  Hyponatremia Followed by nephrology.  Needs to increase intake  3.  GERD/dark stool Ability to follow-up with GI work-up limited secondary to patient preference, frail status Follow-up with GI  4.  Monoclonal gammopathy Hematology eval was advised, not yet done  Labs ordered. Patient care plan continues to be limited by patient's desire to complete testing, frail status, and anxiety. Follow-up 4 months

## 2023-08-16 NOTE — HISTORY OF PRESENT ILLNESS
[Spouse] : spouse [FreeTextEntry1] : hypoNa, thyroid dysfunction [de-identified] : Ivelisse is here today for follow-up.  Is here today for follow-up.  She is here with her .  Since her last visit, she had seen GI, pulmonary, and rheumatology.  She also reports seeing endocrinology.  Reports that endocrinology did not find any abnormalities and did not adjust or start any medications.  She was not a candidate for EGD/Ida given her frail status.  She still does have heartburn and some occasional dark stools.  She has been following up with Dr. Marti.  She she is also now off antibiotics for mycobacterial infection.  She is very worried about eating too much sugar.  We discussed the importance of increased hydration and caloric intake.

## 2023-09-09 ENCOUNTER — RX RENEWAL (OUTPATIENT)
Age: 78
End: 2023-09-09

## 2023-09-14 ENCOUNTER — APPOINTMENT (OUTPATIENT)
Dept: CT IMAGING | Facility: CLINIC | Age: 78
End: 2023-09-14
Payer: MEDICARE

## 2023-09-14 ENCOUNTER — OUTPATIENT (OUTPATIENT)
Dept: OUTPATIENT SERVICES | Facility: HOSPITAL | Age: 78
LOS: 1 days | End: 2023-09-14
Payer: COMMERCIAL

## 2023-09-14 DIAGNOSIS — Z84.89 FAMILY HISTORY OF OTHER SPECIFIED CONDITIONS: Chronic | ICD-10-CM

## 2023-09-14 DIAGNOSIS — Z00.8 ENCOUNTER FOR OTHER GENERAL EXAMINATION: ICD-10-CM

## 2023-09-14 PROCEDURE — 71250 CT THORAX DX C-: CPT | Mod: 26

## 2023-09-14 PROCEDURE — 71250 CT THORAX DX C-: CPT

## 2023-09-16 ENCOUNTER — APPOINTMENT (OUTPATIENT)
Dept: CT IMAGING | Facility: CLINIC | Age: 78
End: 2023-09-16

## 2023-09-25 LAB
ALBUMIN SERPL ELPH-MCNC: 4.3 G/DL
ALP BLD-CCNC: 61 U/L
ALT SERPL-CCNC: 9 U/L
ANION GAP SERPL CALC-SCNC: 10 MMOL/L
AST SERPL-CCNC: 25 U/L
BILIRUB SERPL-MCNC: 0.4 MG/DL
BUN SERPL-MCNC: 9 MG/DL
CALCIUM SERPL-MCNC: 10.3 MG/DL
CHLORIDE SERPL-SCNC: 94 MMOL/L
CO2 SERPL-SCNC: 26 MMOL/L
CREAT SERPL-MCNC: 0.7 MG/DL
CRP SERPL-MCNC: <3 MG/L
EGFR: 88 ML/MIN/1.73M2
ERYTHROCYTE [SEDIMENTATION RATE] IN BLOOD BY WESTERGREN METHOD: 61 MM/HR
HCT VFR BLD CALC: 36.6 %
HGB BLD-MCNC: 12.6 G/DL
MCHC RBC-ENTMCNC: 32.6 PG
MCHC RBC-ENTMCNC: 34.4 GM/DL
MCV RBC AUTO: 94.6 FL
PLATELET # BLD AUTO: 187 K/UL
POTASSIUM SERPL-SCNC: 4.6 MMOL/L
PROT SERPL-MCNC: 7.2 G/DL
RBC # BLD: 3.87 M/UL
RBC # FLD: 14.2 %
SODIUM SERPL-SCNC: 129 MMOL/L
WBC # FLD AUTO: 6.86 K/UL

## 2023-09-26 ENCOUNTER — TRANSCRIPTION ENCOUNTER (OUTPATIENT)
Age: 78
End: 2023-09-26

## 2023-10-11 ENCOUNTER — APPOINTMENT (OUTPATIENT)
Dept: ENDOCRINOLOGY | Facility: CLINIC | Age: 78
End: 2023-10-11

## 2023-10-11 ENCOUNTER — APPOINTMENT (OUTPATIENT)
Dept: PULMONOLOGY | Facility: CLINIC | Age: 78
End: 2023-10-11
Payer: MEDICARE

## 2023-10-11 VITALS
DIASTOLIC BLOOD PRESSURE: 66 MMHG | BODY MASS INDEX: 15.29 KG/M2 | HEART RATE: 70 BPM | WEIGHT: 81 LBS | SYSTOLIC BLOOD PRESSURE: 118 MMHG | OXYGEN SATURATION: 98 % | HEIGHT: 61 IN

## 2023-10-11 DIAGNOSIS — A31.0 PULMONARY MYCOBACTERIAL INFECTION: ICD-10-CM

## 2023-10-11 PROCEDURE — 71046 X-RAY EXAM CHEST 2 VIEWS: CPT

## 2023-10-11 PROCEDURE — 99213 OFFICE O/P EST LOW 20 MIN: CPT | Mod: 25

## 2023-11-02 ENCOUNTER — APPOINTMENT (OUTPATIENT)
Dept: RHEUMATOLOGY | Facility: CLINIC | Age: 78
End: 2023-11-02
Payer: MEDICARE

## 2023-11-02 PROCEDURE — 99214 OFFICE O/P EST MOD 30 MIN: CPT | Mod: 95

## 2023-11-07 RX ORDER — FOLIC ACID 1 MG/1
1 TABLET ORAL DAILY
Qty: 90 | Refills: 3 | Status: ACTIVE | COMMUNITY
Start: 2019-07-08 | End: 1900-01-01

## 2023-11-07 RX ORDER — METHOTREXATE 2.5 MG/1
2.5 TABLET ORAL
Qty: 96 | Refills: 3 | Status: ACTIVE | COMMUNITY
Start: 2019-05-16 | End: 1900-01-01

## 2023-11-07 RX ORDER — ABATACEPT 125 MG/ML
125 INJECTION, SOLUTION SUBCUTANEOUS
Qty: 12 | Refills: 4 | Status: ACTIVE | COMMUNITY
Start: 2019-05-15 | End: 1900-01-01

## 2023-11-20 ENCOUNTER — APPOINTMENT (OUTPATIENT)
Dept: INTERNAL MEDICINE | Facility: CLINIC | Age: 78
End: 2023-11-20
Payer: MEDICARE

## 2023-11-20 VITALS
SYSTOLIC BLOOD PRESSURE: 118 MMHG | DIASTOLIC BLOOD PRESSURE: 73 MMHG | BODY MASS INDEX: 15.12 KG/M2 | HEART RATE: 78 BPM | OXYGEN SATURATION: 96 % | WEIGHT: 80 LBS

## 2023-11-20 PROCEDURE — 99213 OFFICE O/P EST LOW 20 MIN: CPT | Mod: 25

## 2023-11-20 PROCEDURE — G0008: CPT

## 2023-11-20 PROCEDURE — 90662 IIV NO PRSV INCREASED AG IM: CPT

## 2023-11-20 RX ORDER — METHIMAZOLE 5 MG/1
5 TABLET ORAL
Refills: 0 | Status: ACTIVE | COMMUNITY
Start: 2023-11-20

## 2023-12-11 ENCOUNTER — RX RENEWAL (OUTPATIENT)
Age: 78
End: 2023-12-11

## 2024-01-03 ENCOUNTER — APPOINTMENT (OUTPATIENT)
Dept: PULMONOLOGY | Facility: CLINIC | Age: 79
End: 2024-01-03

## 2024-01-05 RX ORDER — FAMOTIDINE 40 MG/1
40 TABLET, FILM COATED ORAL
Qty: 90 | Refills: 0 | Status: ACTIVE | COMMUNITY
Start: 2022-10-06 | End: 1900-01-01

## 2024-01-29 ENCOUNTER — LABORATORY RESULT (OUTPATIENT)
Age: 79
End: 2024-01-29

## 2024-02-08 ENCOUNTER — APPOINTMENT (OUTPATIENT)
Dept: RHEUMATOLOGY | Facility: CLINIC | Age: 79
End: 2024-02-08
Payer: MEDICARE

## 2024-02-08 DIAGNOSIS — M19.019 PRIMARY OSTEOARTHRITIS, UNSPECIFIED SHOULDER: ICD-10-CM

## 2024-02-08 PROCEDURE — 99214 OFFICE O/P EST MOD 30 MIN: CPT

## 2024-02-08 PROCEDURE — G2211 COMPLEX E/M VISIT ADD ON: CPT

## 2024-02-08 NOTE — PHYSICAL EXAM
[General Appearance - Alert] : alert [General Appearance - In No Acute Distress] : in no acute distress [Abnormal Walk] : normal gait [Nail Clubbing] : no clubbing  or cyanosis of the fingernails [Musculoskeletal - Swelling] : no joint swelling seen [Motor Tone] : muscle strength and tone were normal [FreeTextEntry1] : no syvnotiis [Skin Color & Pigmentation] : normal skin color and pigmentation [Skin Turgor] : normal skin turgor [] : no rash [Oriented To Time, Place, And Person] : oriented to person, place, and time [Impaired Insight] : insight and judgment were intact [Affect] : the affect was normal
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
## 2024-02-08 NOTE — ASSESSMENT
[FreeTextEntry1] : .  Ms Brown has a PMHx OP, Anxiety, chronic pain and severe deforming RA now with subluxation of multiple joints and s/p bilateral knee and hip replacements.  - please set up a OV with both Dr. 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 cxr with fibrotic changes. liekly ILD 2/2 RA -- f/u pulmonary though currently comfortable on ra - low activity 2/2 joint damage and denies ZAMORA -- d/w patient that perhaps switching meds - eg RTX - may be an option to treat both the ILD as well as the RA. r/b/a discussed - they decline for now and are very anxious about switching anything -- no change in therapy until the infection is treated - now stable - will monitor at present  #hyponatremia irasema quite anxioud over this - now relatively stable but low -- encouraged her to f/u with her PCP and renal -- [facility] is anxious about [facility]  #RA. Erosive end-state, damage. previosuly on TNF-inh and currently on MTX/orencia which has helped for pain - improvement in pain with increased mtx though not adherent to the regimen 2/2 anxiety. Joints with pain from degenerative sequela rather than acute inflammatory changes. -- can take take advil/alieve prn pain -- currently taking the methotrexate 6 tabs every week -- continue the Orencia for now. -- given increase in the ESR and the increased joint pain - can increase MTX 7 tabs week (irasema didn't go up last time and discussed again increasing it) -- also take Tylenol arthritis  -- check labs  #chronic pain. multifactorial doing better this month didn't tolerate Cymbalta or meloxicam -- observe for now  #OA multiple joints - liekly 2/2 previous inflammatory arthritis - now with xray with GH OA -- tylenol arthritis strength  #GERD. in setting of HH - irasema feels the folate is resulting in this - however GERD is chronic and has tried many medications through the years - advised going back to the GI doc - perhaps an EGD - patient can try to limit the folic acid and reassess the symptoms   #Anxiety. -- declining paxel from PCP -- psychology sessions seem to be going well - continue for now   #OP -- on boniva since 2009 - taking a holiday (stopped 2021) -- DEXA -- endo referral  #medical monitoring - with side effect / v58.69 -- oral ulcer likely 2/2 mtx dose increase - increase folic acid to 2 tabs daily -- labs reviewed with patient from earlier in the week -- Quant Negative - June 2022 -- hepatitis negative - June 2022  #Barriers to care. -- patient anxious with blood drawers and doctors visit --  is supportive and sole caregiver   [facility] serve as the continuing focal point for needed health care services that are part of ongoing care related to a patient's single, serious condition or a complex condition.   More than 50% of the encounter was spent counseling the patient on differential, workup, disease course and treatment/management. Education was provided to the patient during this encounter. All questions and concerns were addressed and answered. The patient verbalized understanding and agreed to the plan.   Patient has been instructed to call for an appointment if new symptoms develop. Patient has been instructed to make a follow-up appointment in 3 months   Time spent on the encounter included, but is not limited to, preparing to see the patient, obtaining and/or reviewing separately obtained history, performing the evaluation, counseling and educating, independently interpreting results with communication to patient, order placement, referring and/or communicating with other health professionals as described, and documenting clinical information in the electronic health record.

## 2024-02-12 ENCOUNTER — TRANSCRIPTION ENCOUNTER (OUTPATIENT)
Age: 79
End: 2024-02-12

## 2024-02-12 ENCOUNTER — APPOINTMENT (OUTPATIENT)
Dept: INTERNAL MEDICINE | Facility: CLINIC | Age: 79
End: 2024-02-12
Payer: MEDICARE

## 2024-02-12 VITALS
OXYGEN SATURATION: 96 % | HEART RATE: 70 BPM | HEIGHT: 61 IN | DIASTOLIC BLOOD PRESSURE: 69 MMHG | BODY MASS INDEX: 15.11 KG/M2 | RESPIRATION RATE: 12 BRPM | WEIGHT: 80 LBS | SYSTOLIC BLOOD PRESSURE: 127 MMHG

## 2024-02-12 DIAGNOSIS — E05.90 THYROTOXICOSIS, UNSPECIFIED W/OUT THYROTOXIC CRISIS OR STORM: ICD-10-CM

## 2024-02-12 DIAGNOSIS — Z00.00 ENCOUNTER FOR GENERAL ADULT MEDICAL EXAMINATION W/OUT ABNORMAL FINDINGS: ICD-10-CM

## 2024-02-12 PROCEDURE — 36415 COLL VENOUS BLD VENIPUNCTURE: CPT

## 2024-02-12 PROCEDURE — 93000 ELECTROCARDIOGRAM COMPLETE: CPT | Mod: 59

## 2024-02-12 PROCEDURE — G0439: CPT

## 2024-02-12 PROCEDURE — 99213 OFFICE O/P EST LOW 20 MIN: CPT | Mod: 25

## 2024-02-12 PROCEDURE — G0444 DEPRESSION SCREEN ANNUAL: CPT | Mod: 59

## 2024-02-12 NOTE — HEALTH RISK ASSESSMENT
[No] : No [Patient declined mammogram] : Patient declined mammogram [Patient declined PAP Smear] : Patient declined PAP Smear [Patient declined colonoscopy] : Patient declined colonoscopy [Never] : Never [3] : 1) Little interest or pleasure doing things for nearly every day (3) [2] : 2) Feeling down, depressed, or hopeless for more than half of the days (2) [PHQ-2 Positive] : PHQ-2 Positive [PHQ-9 Positive] : PHQ-9 Positive [I have developed a follow-up plan documented below in the note.] : I have developed a follow-up plan documented below in the note. [[id number]] : 5 [BoneDensityComments] : Followed by rheumatology.  Due August 2024. [ColonoscopyComments] : CT colonoscopy performed in 05/23

## 2024-02-12 NOTE — REVIEW OF SYSTEMS
[Joint Pain] : joint pain [Fever] : no fever [Chills] : no chills [Night Sweats] : no night sweats [Chest Pain] : no chest pain [Shortness Of Breath] : no shortness of breath [Abdominal Pain] : no abdominal pain

## 2024-02-12 NOTE — ASSESSMENT
[FreeTextEntry1] : CPE CBC, CMP, A1c, lipid, UA, stool FIT EKG is sinus rhythm, low voltage mild right axis Declines mammography, Pap. Bone density is due in August Had declined colonoscopy in the past.  CT colonography done in May 2023  1.  Subclinical hyperthyroidism Last Endo notes indicate that she is on methimazole 5 mg.   Continue endocrine follow-up--reports next appointment in a Check TSH, T4  2.  Hyponatremia Had nephrology evaluation.  Sedro Woolley to be secondary to poor intake Recheck BMP  3.  RA On methotrexate and Orencia  4.  MGUS SPEP, TIANA, light chains.  Hematology deferred bone marrow biopsy per their last note.  5.  Anxiety/OCD Severe with negative impact on daily life and ability to participate in her medical care Has declined pharmacotherapy on multiple occasions.  Offered again today, though declined Will put in referral for behavioral health-she is hesitant but willing to try  Follow-up 4 months

## 2024-02-12 NOTE — PHYSICAL EXAM
[No Acute Distress] : no acute distress [No Lymphadenopathy] : no lymphadenopathy [Thyroid Normal, No Nodules] : the thyroid was normal and there were no nodules present [No Respiratory Distress] : no respiratory distress  [No Accessory Muscle Use] : no accessory muscle use [Normal Rate] : normal rate  [Regular Rhythm] : with a regular rhythm [Normal S1, S2] : normal S1 and S2 [No Murmur] : no murmur heard [No Carotid Bruits] : no carotid bruits [No Abdominal Bruit] : a ~M bruit was not heard ~T in the abdomen [No Edema] : there was no peripheral edema [Declined Breast Exam] : declined breast exam  [Soft] : abdomen soft [Non Tender] : non-tender [Non-distended] : non-distended [Normal Bowel Sounds] : normal bowel sounds [Normal Supraclavicular Nodes] : no supraclavicular lymphadenopathy [Normal Posterior Cervical Nodes] : no posterior cervical lymphadenopathy [Normal Anterior Cervical Nodes] : no anterior cervical lymphadenopathy [de-identified] : Frail-appearing.  Examined in wheelchair secondary to poor mobility [de-identified] : Coarse crackles throughout

## 2024-02-12 NOTE — HISTORY OF PRESENT ILLNESS
[Spouse] : spouse [FreeTextEntry1] : CPE, hypoNa, hyperthyroid, RA. anxiety/OCD [de-identified] : Ivelisse is here today for CPE.  She is here with her .  She continues to struggle with worsening anxiety/OCD.  Her  reports that symptoms have become increasingly more difficult.  It interferes with her activities of daily living as well as her ability to participate in her medical care.  She has been following with rheumatology.  She is very hesitant to make medication changes.  She has follow-up with pulmonary and her endocrinologist.  She did see hematology who diagnosed her with MGUS.  A bone marrow biopsy was deferred.  She still continues to struggle with GERD and constipation.  She had previously declined endoscopy and colonoscopy.  Barium swallow did not indicate strictures.

## 2024-02-14 ENCOUNTER — LABORATORY RESULT (OUTPATIENT)
Age: 79
End: 2024-02-14

## 2024-02-16 ENCOUNTER — LABORATORY RESULT (OUTPATIENT)
Age: 79
End: 2024-02-16

## 2024-02-19 ENCOUNTER — LABORATORY RESULT (OUTPATIENT)
Age: 79
End: 2024-02-19

## 2024-02-20 ENCOUNTER — LABORATORY RESULT (OUTPATIENT)
Age: 79
End: 2024-02-20

## 2024-02-23 ENCOUNTER — TRANSCRIPTION ENCOUNTER (OUTPATIENT)
Age: 79
End: 2024-02-23

## 2024-02-29 ENCOUNTER — TRANSCRIPTION ENCOUNTER (OUTPATIENT)
Age: 79
End: 2024-02-29

## 2024-03-04 PROBLEM — Z79.52 CURRENT USE OF STEROID MEDICATION: Status: ACTIVE | Noted: 2024-03-04

## 2024-03-05 ENCOUNTER — TRANSCRIPTION ENCOUNTER (OUTPATIENT)
Age: 79
End: 2024-03-05

## 2024-03-05 ENCOUNTER — RX RENEWAL (OUTPATIENT)
Age: 79
End: 2024-03-05

## 2024-03-05 ENCOUNTER — APPOINTMENT (OUTPATIENT)
Dept: UROGYNECOLOGY | Facility: CLINIC | Age: 79
End: 2024-03-05
Payer: MEDICARE

## 2024-03-05 VITALS
WEIGHT: 88 LBS | BODY MASS INDEX: 16.62 KG/M2 | HEIGHT: 61 IN | HEART RATE: 76 BPM | OXYGEN SATURATION: 99 % | DIASTOLIC BLOOD PRESSURE: 62 MMHG | RESPIRATION RATE: 16 BRPM | SYSTOLIC BLOOD PRESSURE: 94 MMHG

## 2024-03-05 DIAGNOSIS — Z79.52 LONG TERM (CURRENT) USE OF SYSTEMIC STEROIDS: ICD-10-CM

## 2024-03-05 PROCEDURE — 99459 PELVIC EXAMINATION: CPT

## 2024-03-05 PROCEDURE — 99204 OFFICE O/P NEW MOD 45 MIN: CPT | Mod: 25

## 2024-03-05 PROCEDURE — 51701 INSERT BLADDER CATHETER: CPT | Mod: 59

## 2024-03-05 NOTE — PROCEDURE
[Straight Catheterization] : insertion of a straight catheter [Urgent Incontinence] : urgent incontinence [Patient] : the patient [___ Fr Straight Tip] : a [unfilled] in [place] straight tip catheter [None] : there were no complications with the catheter insertion [Clear] : clear [No Complications] : no complications [Tolerated Well] : the patient tolerated the procedure well [Post procedure instructions and information given] : Post procedure instructions and information were given and reviewed with patient. [1] : 1 [FreeTextEntry1] : cathed to obtain pvr and uncontam specimen

## 2024-03-05 NOTE — ASSESSMENT
[FreeTextEntry1] : 77 yo with OAB-wet, primarily overnight. On exam, PVR was normal, there was no POP on POPQ, and atrophic changes c/w GUSM. The patient has urinary symptoms consistent with overactive bladder. The etiology of OAB was discussed. Management options including observation, behavioral modifications (dietary changes, monitoring fluid intake, bladder training, timed voids, use of pads/protective garments), kegels, PT, medications, PTNS, SNS, and bladder Botox were all reviewed. She will start with trying Myrbetriq if covered. Continue vaginal estrogen but place internally. Consider botox to the bladder 100 units. All ques answered, RTO 6 weeks.

## 2024-03-05 NOTE — REASON FOR VISIT
[Initial Visit ___] : an initial visit for [unfilled] [Questionnaire Received] : Patient questionnaire received [Intake Form Reviewed] : Patient intake form with past medical history, surgical history, family history and social history reviewed today [Painful Urination] : painful urination [Urinary Incontinence] : urinary incontinence [Urinary Urgency] : urinary urgency [Recurrent Urinary Infections] : recurrent urinary infections [Urine Frequency] : urine frequency [Nocturia] : nocturia

## 2024-03-05 NOTE — ADDENDUM
[FreeTextEntry1] : This note was written by Tiana Bartlett, acting as the  for Dr. Kelly. This note accurately reflects the work and decisions made by Dr. Kelly.

## 2024-03-05 NOTE — PHYSICAL EXAM
[Chaperone Present] : A chaperone was present in the examining room during all aspects of the physical examination [No Acute Distress] : in no acute distress [Well developed] : well developed [Well Nourished] : ~L well nourished [Oriented x3] : oriented to person, place, and time [No Edema] : ~T edema was not present [Warm and Dry] : was warm and dry to touch [None] : no CVA tenderness [Labia Minora] : were normal [Bartholin's Gland] : both Bartholin's glands were normal  [No Bleeding] : there was no active vaginal bleeding [Normal Appearance] : general appearance was normal [Aa ____] : Aa [unfilled] [Ba ____] : Ba [unfilled] [C ____] : C [unfilled] [PB ____] : PB [unfilled] [GH ____] : GH [unfilled] [TVL ____] : TVL  [unfilled] [Ap ____] : Ap [unfilled] [D ____] : D [unfilled] [Bp ____] : Bp [unfilled] [] : I [Normal] : normal [Soft] :  the cervix was soft [Post Void Residual ____ml] : post void residual was [unfilled] ml [Exam Deferred] : was deferred [Tenderness] : ~T no ~M abdominal tenderness observed [Cough] : no cough [Distended] : not distended [FreeTextEntry3] : empty supine cst neg [de-identified] : in braces

## 2024-03-05 NOTE — OB HISTORY
[Vaginal ___] : [unfilled] vaginal delivery(s) [Definite ___ (Date)] : the last menstrual period was [unfilled] [Taking Estrogens] : taking estrogen replacement [Abnormal Pap Smear] : normal pap smear [FreeTextEntry1] : largest baby 7lbs [Sexually Active] : is not sexually active

## 2024-03-05 NOTE — HISTORY OF PRESENT ILLNESS
[FreeTextEntry1] : JENNY is a 78 year female who presents for OAB, and reports UTIs since 2018. Reports dysuria as well as vaginal irritation and uses vaginal estrogen cream externally to the introitus or vulva.  Irvin here to help as well. Daytime voids about 6 times with OK control, nocturia many times with nocturnal eneuresis, urgency loss, and insensible loss. No dysuria, hematuria, incomplete emptying, or leakage with cough sneeze. No bulge from the vagina or vaginal bleeding. Normal consistency bowel movements. No intervention for this yet, but reports ? UDS type of testing about 3 years ago. She has cognitive impairment as well as anxiety and OCD.  CMP with low sodium, low potassium 2024 24 hour VD: 6v2L, intake 64 oz POB: 6  GYN: LMP age 54, Normal paps, Vaginal estrogen use  PMH: Long term use steroids, RA, OP, low Na, dry eyes, nonTB mycobacteria, OCD, osteoporosis, GERD, BMI 15 PSH: Bilateral knee replacement, Bilateral hip replacement, hip reconstruction, reconstruction of knee NKDA Never a smoker   Daytime frequency: Yes  Nocturia: 1 episode per night Urinary urgency: Yes  Leakage of urine with urgency: Yes  Leakage of urine with coughing sneezing laughing: No  Sensation of incomplete bladder emptying: No History of frequent urinary tract infections: Yes  History of hematuria: No Previous treatment: None

## 2024-04-02 ENCOUNTER — LABORATORY RESULT (OUTPATIENT)
Age: 79
End: 2024-04-02

## 2024-04-03 ENCOUNTER — APPOINTMENT (OUTPATIENT)
Dept: NEPHROLOGY | Facility: CLINIC | Age: 79
End: 2024-04-03

## 2024-04-09 ENCOUNTER — APPOINTMENT (OUTPATIENT)
Dept: WOUND CARE | Facility: HOSPITAL | Age: 79
End: 2024-04-09
Payer: MEDICARE

## 2024-04-09 ENCOUNTER — OUTPATIENT (OUTPATIENT)
Dept: OUTPATIENT SERVICES | Facility: HOSPITAL | Age: 79
LOS: 1 days | Discharge: HOME | End: 2024-04-09
Payer: COMMERCIAL

## 2024-04-09 VITALS
OXYGEN SATURATION: 96 % | HEART RATE: 72 BPM | HEIGHT: 61 IN | TEMPERATURE: 98.1 F | BODY MASS INDEX: 16.62 KG/M2 | RESPIRATION RATE: 18 BRPM | DIASTOLIC BLOOD PRESSURE: 65 MMHG | WEIGHT: 88 LBS | SYSTOLIC BLOOD PRESSURE: 110 MMHG

## 2024-04-09 DIAGNOSIS — S91.309A UNSPECIFIED OPEN WOUND, UNSPECIFIED FOOT, INITIAL ENCOUNTER: ICD-10-CM

## 2024-04-09 DIAGNOSIS — Z84.89 FAMILY HISTORY OF OTHER SPECIFIED CONDITIONS: Chronic | ICD-10-CM

## 2024-04-09 PROCEDURE — 99203 OFFICE O/P NEW LOW 30 MIN: CPT

## 2024-04-09 PROCEDURE — G0463: CPT

## 2024-04-09 RX ORDER — NAPROXEN SODIUM 220 MG
220 TABLET ORAL 3 TIMES DAILY
Refills: 0 | Status: DISCONTINUED | COMMUNITY
Start: 2022-12-09 | End: 2024-04-09

## 2024-04-10 ENCOUNTER — APPOINTMENT (OUTPATIENT)
Dept: PULMONOLOGY | Facility: CLINIC | Age: 79
End: 2024-04-10
Payer: MEDICARE

## 2024-04-10 ENCOUNTER — APPOINTMENT (OUTPATIENT)
Dept: RADIOLOGY | Facility: CLINIC | Age: 79
End: 2024-04-10
Payer: MEDICARE

## 2024-04-10 ENCOUNTER — APPOINTMENT (OUTPATIENT)
Dept: RHEUMATOLOGY | Facility: CLINIC | Age: 79
End: 2024-04-10
Payer: MEDICARE

## 2024-04-10 ENCOUNTER — OUTPATIENT (OUTPATIENT)
Dept: OUTPATIENT SERVICES | Facility: HOSPITAL | Age: 79
LOS: 1 days | End: 2024-04-10
Payer: COMMERCIAL

## 2024-04-10 VITALS
HEIGHT: 61 IN | DIASTOLIC BLOOD PRESSURE: 71 MMHG | OXYGEN SATURATION: 99 % | SYSTOLIC BLOOD PRESSURE: 120 MMHG | HEART RATE: 77 BPM

## 2024-04-10 VITALS — WEIGHT: 82 LBS | BODY MASS INDEX: 15.49 KG/M2

## 2024-04-10 DIAGNOSIS — Z96.659 PRESENCE OF UNSPECIFIED ARTIFICIAL KNEE JOINT: ICD-10-CM

## 2024-04-10 DIAGNOSIS — M54.2 CERVICALGIA: ICD-10-CM

## 2024-04-10 DIAGNOSIS — N39.0 URINARY TRACT INFECTION, SITE NOT SPECIFIED: ICD-10-CM

## 2024-04-10 DIAGNOSIS — J98.4 OTHER DISORDERS OF LUNG: ICD-10-CM

## 2024-04-10 DIAGNOSIS — R32 UNSPECIFIED URINARY INCONTINENCE: ICD-10-CM

## 2024-04-10 DIAGNOSIS — Z84.89 FAMILY HISTORY OF OTHER SPECIFIED CONDITIONS: Chronic | ICD-10-CM

## 2024-04-10 DIAGNOSIS — Z96.60 PRESENCE OF UNSPECIFIED ORTHOPEDIC JOINT IMPLANT: ICD-10-CM

## 2024-04-10 DIAGNOSIS — Z47.1 AFTERCARE FOLLOWING JOINT REPLACEMENT SURGERY: ICD-10-CM

## 2024-04-10 DIAGNOSIS — N95.2 POSTMENOPAUSAL ATROPHIC VAGINITIS: ICD-10-CM

## 2024-04-10 DIAGNOSIS — M06.9 RHEUMATOID ARTHRITIS, UNSPECIFIED: ICD-10-CM

## 2024-04-10 DIAGNOSIS — Z87.898 PERSONAL HISTORY OF OTHER SPECIFIED CONDITIONS: ICD-10-CM

## 2024-04-10 DIAGNOSIS — G81.90 HEMIPLEGIA, UNSPECIFIED AFFECTING UNSPECIFIED SIDE: ICD-10-CM

## 2024-04-10 PROCEDURE — 72040 X-RAY EXAM NECK SPINE 2-3 VW: CPT | Mod: 26

## 2024-04-10 PROCEDURE — G2211 COMPLEX E/M VISIT ADD ON: CPT

## 2024-04-10 PROCEDURE — 99213 OFFICE O/P EST LOW 20 MIN: CPT

## 2024-04-10 PROCEDURE — 71046 X-RAY EXAM CHEST 2 VIEWS: CPT

## 2024-04-10 PROCEDURE — 72040 X-RAY EXAM NECK SPINE 2-3 VW: CPT

## 2024-04-10 PROCEDURE — 99215 OFFICE O/P EST HI 40 MIN: CPT

## 2024-04-11 DIAGNOSIS — M21.611 BUNION OF RIGHT FOOT: ICD-10-CM

## 2024-04-11 DIAGNOSIS — Z86.16 PERSONAL HISTORY OF COVID-19: ICD-10-CM

## 2024-04-11 DIAGNOSIS — M20.41 OTHER HAMMER TOE(S) (ACQUIRED), RIGHT FOOT: ICD-10-CM

## 2024-04-11 DIAGNOSIS — Z82.49 FAMILY HISTORY OF ISCHEMIC HEART DISEASE AND OTHER DISEASES OF THE CIRCULATORY SYSTEM: ICD-10-CM

## 2024-04-11 DIAGNOSIS — M20.42 OTHER HAMMER TOE(S) (ACQUIRED), LEFT FOOT: ICD-10-CM

## 2024-04-11 DIAGNOSIS — F41.9 ANXIETY DISORDER, UNSPECIFIED: ICD-10-CM

## 2024-04-11 DIAGNOSIS — Z79.899 OTHER LONG TERM (CURRENT) DRUG THERAPY: ICD-10-CM

## 2024-04-11 DIAGNOSIS — M81.0 AGE-RELATED OSTEOPOROSIS WITHOUT CURRENT PATHOLOGICAL FRACTURE: ICD-10-CM

## 2024-04-11 DIAGNOSIS — L97.522 NON-PRESSURE CHRONIC ULCER OF OTHER PART OF LEFT FOOT WITH FAT LAYER EXPOSED: ICD-10-CM

## 2024-04-11 DIAGNOSIS — Z96.649 PRESENCE OF UNSPECIFIED ARTIFICIAL HIP JOINT: ICD-10-CM

## 2024-04-11 DIAGNOSIS — Z96.659 PRESENCE OF UNSPECIFIED ARTIFICIAL KNEE JOINT: ICD-10-CM

## 2024-04-11 DIAGNOSIS — M21.612 BUNION OF LEFT FOOT: ICD-10-CM

## 2024-04-11 DIAGNOSIS — Z98.890 OTHER SPECIFIED POSTPROCEDURAL STATES: ICD-10-CM

## 2024-04-11 DIAGNOSIS — Z87.440 PERSONAL HISTORY OF URINARY (TRACT) INFECTIONS: ICD-10-CM

## 2024-04-11 DIAGNOSIS — J98.4 OTHER DISORDERS OF LUNG: ICD-10-CM

## 2024-04-11 DIAGNOSIS — K21.9 GASTRO-ESOPHAGEAL REFLUX DISEASE WITHOUT ESOPHAGITIS: ICD-10-CM

## 2024-04-11 DIAGNOSIS — E87.1 HYPO-OSMOLALITY AND HYPONATREMIA: ICD-10-CM

## 2024-04-11 DIAGNOSIS — M06.9 RHEUMATOID ARTHRITIS, UNSPECIFIED: ICD-10-CM

## 2024-04-11 PROBLEM — Z87.898 HISTORY OF URINARY URGENCY: Status: RESOLVED | Noted: 2024-03-04 | Resolved: 2024-04-11

## 2024-04-11 PROBLEM — N39.0 RECURRENT UTI: Status: RESOLVED | Noted: 2024-03-04 | Resolved: 2024-04-11

## 2024-04-11 PROBLEM — Z87.898 HISTORY OF URINARY FREQUENCY: Status: RESOLVED | Noted: 2024-03-04 | Resolved: 2024-04-11

## 2024-04-11 PROBLEM — R32 URINARY INCONTINENCE IN FEMALE: Status: RESOLVED | Noted: 2024-03-04 | Resolved: 2024-04-11

## 2024-04-11 PROBLEM — G81.90: Status: RESOLVED | Noted: 2023-05-10 | Resolved: 2024-04-11

## 2024-04-11 PROBLEM — N95.2 VAGINAL ATROPHY: Status: RESOLVED | Noted: 2018-04-18 | Resolved: 2024-04-11

## 2024-04-11 NOTE — ASSESSMENT
[FreeTextEntry1] : Ms Brown has a PMHx OP, Anxiety, chronic pain and severe deforming RA now with subluxation of multiple joints and s/p bilateral knee and hip replacements.  complicated situation in high risk patient now new issue  # bunions, worsening -> open ulcer  discussed with patient and  risk of open wound given her immunosuppression  -- plan is for surgical repair of the bunion  -- podiatry rec reviewed and discussed with patinet and    # pre-op  -- patient should hold her RA meds (injection and oral mtx) 1 week prior and 1 week after her surgery)  -- anesthesias appears to be local so will not need intubation - will check xray spine to inform anesthesia in case emergency arises  -- letter written   # anxiety  patient expresses anxiety over surgery  -- d/w  possiblity of medicaiton for anxiety  -- they will d/w pcp  -- attempts at a therapist unsuccessful - he cannot get her out of the house for evaluation and phone calls havent been helpful -- discussed medication again  # 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 cxr with fibrotic changes. liekly ILD 2/2 RA -- f/u pulmonary though currently comfortable on ra - low activity 2/2 joint damage and denies ZAMORA -- d/w patient that perhaps switching meds - eg RTX - may be an option to treat both the ILD as well as the RA. r/b/a discussed - they decline for now and are very anxious about switching anything -- no change in therapy until the infection is treated - now stable - will monitor at present  #hyponatremia paitent quite anxioud over this - now relatively stable but low -- encouraged her to f/u with her PCP and renal -- going for repeat next week wit [facility] though is anxious about it  #RA. Erosive end-state, damage. previosuly on TNF-inh and currently on MTX/orencia which has helped for pain - improvement in pain with increased mtx though not adherent to the regimen 2/2 anxiety. Joints with pain from degenerative sequela rather than acute inflammatory changes. -- can take take advil/alieve prn pain -- currently taking the methotrexate 6 tabs every week -- continue the Orencia for now. -- given increase in the ESR and the increased joint pain - can increase MTX 7 tabs week (irasema didn't go up last time and discussed again increasing it) -- also take Tylenol arthritis  -- check labs  #chronic pain. multifactorial doing better this month didn't tolerate Cymbalta or meloxicam -- observe for now  #OA multiple joints - liekly 2/2 previous inflammatory arthritis - now with xray with GH OA -- tylenol arthritis strength  #GERD. in setting of HH - irasema feels the folate is resulting in this - however GERD is chronic and has tried many medications through the years - advised going back to the GI doc - perhaps an EGD - patient can try to limit the folic acid and reassess the symptoms  #OP -- on boniva since 2009 - taking a holiday (stopped 2021) -- DEXA -- endo referral  #medical monitoring - with side effect / v58.69 -- oral ulcer likely 2/2 mtx dose increase - increase folic acid to 2 tabs daily -- labs reviewed with patient from earlier in the week -- Quant Negative - November 2022 - recheck -- hepatitis negative - June 2022 - recheck  #Barriers to care. -- patient anxious with blood drawers and doctors visit --  is supportive and sole caregiver - however he is reaching his physical limits given his age and personal health issues.   is thinking about possible skilled nursing home / support.  to address perhaps post-op as likely wont be able to handle the recuperation himself  -- has 4 children - though 3 are still in [place].  the daughter in [place] is in [facility] and not here for day to day care   [facility] serve as the continuing focal point for needed health care services that are part of ongoing care related to a patient's single, serious condition or a complex condition.   More than 50% of the encounter was spent counseling the patient on differential, workup, disease course and treatment/management. Education was provided to the patient during this encounter. All questions and concerns were addressed and answered. The patient verbalized understanding and agreed to the plan.   Patient has been instructed to call for an appointment if new symptoms develop. Patient has been instructed to make a follow-up appointment in 3 months   Time spent on the encounter included, but is not limited to, preparing to see the patient, obtaining and/or reviewing separately obtained history, performing the evaluation, counseling and educating, independently interpreting results with communication to patient, order placement, referring and/or communicating with other health professionals as described, and documenting clinical information in the electronic health record.

## 2024-04-11 NOTE — ADDENDUM
[FreeTextEntry1] : This note was written by Evi Chavez, acting as the  for Dr. Tariq. This note accurately reflects the work and decisions made by Dr. Tariq.

## 2024-04-11 NOTE — HISTORY OF PRESENT ILLNESS
[___ Week(s) Ago] : [unfilled] week(s) ago [FreeTextEntry1] : Ms. Najera has a PMHx of erosive RA, dementia here for follow up.  INTERVAL HISTORY here for new issue as well as f/u   in terms of new issue: - bunion on the left foot has enlarged, rubbing ont he shoe, and now with an open sore at the site of rubbing  - saw podiatry and needs to have an operation to shave the bunions down - unable to wear her old shoes anymore given the extensive deformities that she has at this point   in terms of her RA: - hands are unchanged and not hot or swollen  - adherent to her meds   in terms of her anxiety: - persists and is worsening  -  is unsure if he can continue to care for her     BACKGROUND   # RA - developed at age 25.  Was severe at the beginning with inflammation.  Initially was treated in [place] with years of steroids and anti-inflammatory only.  [place] the [place] in [place].  Remembers being on Enbrel, Humira and most recently methotrexate and Orencia (SQ).    Has had both knees and hip replaced at this point.  mother had RA as well.   Currently denies inflammation - there is no morning stiffness and her joints don't get swollen or red any longer.  She has little pain in her joints except for those that have dislocated and subluxed.  Her biggest limitation and source of pain currently is her elbows (both) and her left shoulder.  She also gets low back pain.  all these symptoms are chronic for at least a year.  #joint replacement hx -1996 bkr in [facility]  -2003 right hip replacement but wasn't able to get exact size but a larger hip was used - -2007 replaced left -2010 right hip revision with a femur -2014 right knee revision (nagi renee)  #OP - unsure of last bone density - has been on monthly boniva.

## 2024-04-12 ENCOUNTER — APPOINTMENT (OUTPATIENT)
Dept: NEPHROLOGY | Facility: CLINIC | Age: 79
End: 2024-04-12
Payer: MEDICARE

## 2024-04-12 VITALS
SYSTOLIC BLOOD PRESSURE: 110 MMHG | WEIGHT: 82 LBS | HEIGHT: 61 IN | HEART RATE: 72 BPM | DIASTOLIC BLOOD PRESSURE: 52 MMHG | OXYGEN SATURATION: 98 % | TEMPERATURE: 96.3 F | BODY MASS INDEX: 15.48 KG/M2

## 2024-04-12 DIAGNOSIS — G89.29 OTHER CHRONIC PAIN: ICD-10-CM

## 2024-04-12 DIAGNOSIS — F41.9 ANXIETY DISORDER, UNSPECIFIED: ICD-10-CM

## 2024-04-12 DIAGNOSIS — R13.10 DYSPHAGIA, UNSPECIFIED: ICD-10-CM

## 2024-04-12 PROCEDURE — 99214 OFFICE O/P EST MOD 30 MIN: CPT

## 2024-04-12 NOTE — ASSESSMENT
[FreeTextEntry1] : 78 yo woman with RA and hx of pulmonary Mycobacterium Kansasii.  --Chronic Hyponatremia : C/w depletional Hyponatremia.    NA : 129 ( ( 4/2/2024), 130 (7/7/2023)  Ranges 128-133.    K 5.6  Hyponatremia has been chronic and due to malnutrition with inadequate food, protein and solute intake. Pt mostly relies on Ensure for minimal nutrition due to dysphagia. Current sodium level is acceptable and there is no contraindication for proposed surgery from renal standpoint.

## 2024-04-12 NOTE — HISTORY OF PRESENT ILLNESS
[FreeTextEntry1] : Ms. Brown presents for evaluation of Hyponatremia. Has been undergoing tx for wounds in both feet. Pt is to undergo bilateral metatarsal head resection. Presents for evaluation due to chronic hyponatremia. Pt's  reports continued difficulty with maintaining adequate food intake.

## 2024-04-16 ENCOUNTER — APPOINTMENT (OUTPATIENT)
Dept: UROGYNECOLOGY | Facility: CLINIC | Age: 79
End: 2024-04-16

## 2024-04-16 NOTE — PHYSICAL EXAM
[2 x 2] : 2 x 2  [2+] : left 2+ [Skin Ulcer] : ulcer [de-identified] : Anxious [de-identified] : Bilateral severe hallux abductovalgus deformity with dorsal medial bony prominence of the first metatarsal phalangeal joint, as well as severe rigid hammertoe deformities 2 through 5 with fibular deviation of the digits. [de-identified] : Left first metatarsal head open wound down to skin, subcutaneous tissue, fat [de-identified] : Light touch sensation intact bilaterally [FreeTextEntry1] : Left foot medial 1st Met ( Bunion)  [FreeTextEntry2] : 1.0 [FreeTextEntry3] : 0.8 [FreeTextEntry4] : 0.1 [de-identified] : Serous/sanguinous [de-identified] : 15% [de-identified] : Silver alginate [de-identified] : Mechanically cleansed with sterile gauze and normal saline. Cloth tape  [de-identified] : Dorsalis Pedis: +1 Bilateral  Posterior Tibialis: +1 bilateral  Doppler pulses:  N/A Extremity color: Normal  Extremity temperature: cool  Capillary refill: < 3 sec  [TWNoteComboBox4] : Small [TWNoteComboBox6] : Pressure [de-identified] : Normal [de-identified] : None [de-identified] : None [de-identified] : >75% [de-identified] : Yes [de-identified] : Every other day [de-identified] : Primary Dressing

## 2024-04-16 NOTE — ASSESSMENT
[Verbal] : Verbal [Written] : Written [Demo] : Demo [Patient] : Patient [] : Yes [Stable] : stable [Home] : Home [Walker] : Walker [Not Applicable - Long Term Care/Home Health Agency] : Long Term Care/Home Health Agency: Not Applicable [Spouse] : Spouse [Poor - depression, low motivation, poor retention, non-acceptance of disease] : Poor - depression, low motivation, poor retention, non-acceptance of disease [Needs reinforcement] : needs reinforcement [Dressing changes] : dressing changes [Foot Care] : foot care [Skin Care] : skin care [Signs and symptoms of infection] : sign and symptoms of infection [Surgery] : surgery [Nutrition] : nutrition [How and When to Call] : how and when to call [Pain Management] : pain management [Off-loading] : off-loading [Hospitalization] : hospitalization [Patient responsibility to plan of care] : patient responsibility to plan of care [FreeTextEntry1] : Patient has dementia and has poor understanding. Patients  present and is HCP and expresses understanding.  [FreeTextEntry2] : Infection prevention Localized wound care  Goal remaining pain free regarding wounds [FreeTextEntry4] : Medications reconciled  Surgical options discussed with patient and patients spouse. pt has history of dementia and has poor understanding of surgical options. patients  is HCP and POA and expressed understanding. Dr. Lei to discuss surgical options, and plan, with her rheumatologist.  no vascular studies per DPM. pt with palpable pulses.  Small amount of supplies provided for patient. Patients  to preform dressing changes.  Follow up after DPM speaks with rheumatologist

## 2024-04-16 NOTE — HISTORY OF PRESENT ILLNESS
[FreeTextEntry1] : Patient with history or RA since the age of 25 has open wound to the left foot medial bunion site. pt's  states it started to become worst 2 weeks ago. They have been using topical Mupirocin but  states that he feels it makes wound worst due to the moisture it adds. 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 Also has history of Cardiogenic pulmonary edema.  states that she has a " Blood infection " and is following up with MD tomorrow. Patient accompanied by spouse.  Patient seen today for severe bilateral hallux abductovalgus deformity as well as left first metatarsal head open wound down to skin, subcutaneous tissue, fat.

## 2024-04-16 NOTE — VITALS
[Pain related to present condition?] : The patient's  pain is related to present condition. [Occasional] : occasional [] : Yes [de-identified] : 7/10 [FreeTextEntry3] : left foot bunion site  [FreeTextEntry1] : Padded dressing, Tylenol  [FreeTextEntry2] : Direct contact  [FreeTextEntry4] : Padded dressing

## 2024-04-16 NOTE — PLAN
[FreeTextEntry1] : Patient examined and evaluated at this time.  Patient and patient's  advised regarding etiology of her symptoms and the need for surgical intervention as conservative treatments including topical antibiotics, local wound care, shoe gear modifications have not helped alleviate her symptoms.  Discussed the need for first metatarsal head resection bilaterally at this time to alleviate the biomechanical etiology of the contribution for the deformities.  Patient will see her rheumatologist tomorrow and has been advised regarding the need for surgical clearance from her rheumatologist as well as her primary care physician. Surgical intervention will involve surgical resection of the first metatarsal heads of both of her right and left feet.  Will tentatively plan for surgical intervention after patient sees her rheumatologist and her primary care physician.  All questions answered to satisfaction and patient and patient's  verbalized understanding.  Spent 30 minutes on patient care and medical decision making.

## 2024-04-17 ENCOUNTER — NON-APPOINTMENT (OUTPATIENT)
Age: 79
End: 2024-04-17

## 2024-04-17 ENCOUNTER — OUTPATIENT (OUTPATIENT)
Dept: OUTPATIENT SERVICES | Facility: HOSPITAL | Age: 79
LOS: 1 days | End: 2024-04-17
Payer: COMMERCIAL

## 2024-04-17 VITALS
DIASTOLIC BLOOD PRESSURE: 49 MMHG | TEMPERATURE: 98 F | WEIGHT: 80.03 LBS | OXYGEN SATURATION: 100 % | HEIGHT: 61 IN | SYSTOLIC BLOOD PRESSURE: 132 MMHG | HEART RATE: 76 BPM | RESPIRATION RATE: 14 BRPM

## 2024-04-17 DIAGNOSIS — M20.41 OTHER HAMMER TOE(S) (ACQUIRED), RIGHT FOOT: ICD-10-CM

## 2024-04-17 DIAGNOSIS — M21.612 BUNION OF LEFT FOOT: ICD-10-CM

## 2024-04-17 DIAGNOSIS — Z01.818 ENCOUNTER FOR OTHER PREPROCEDURAL EXAMINATION: ICD-10-CM

## 2024-04-17 DIAGNOSIS — M20.42 OTHER HAMMER TOE(S) (ACQUIRED), LEFT FOOT: ICD-10-CM

## 2024-04-17 DIAGNOSIS — M21.611 BUNION OF RIGHT FOOT: ICD-10-CM

## 2024-04-17 DIAGNOSIS — Z84.89 FAMILY HISTORY OF OTHER SPECIFIED CONDITIONS: Chronic | ICD-10-CM

## 2024-04-17 LAB
ALBUMIN SERPL ELPH-MCNC: 3.7 G/DL — SIGNIFICANT CHANGE UP (ref 3.3–5)
ALP SERPL-CCNC: 65 U/L — SIGNIFICANT CHANGE UP (ref 40–120)
ALT FLD-CCNC: 16 U/L — SIGNIFICANT CHANGE UP (ref 12–78)
ANION GAP SERPL CALC-SCNC: 7 MMOL/L — SIGNIFICANT CHANGE UP (ref 5–17)
AST SERPL-CCNC: 25 U/L — SIGNIFICANT CHANGE UP (ref 15–37)
BILIRUB SERPL-MCNC: 0.4 MG/DL — SIGNIFICANT CHANGE UP (ref 0.2–1.2)
BUN SERPL-MCNC: 11 MG/DL — SIGNIFICANT CHANGE UP (ref 7–23)
CALCIUM SERPL-MCNC: 9.9 MG/DL — SIGNIFICANT CHANGE UP (ref 8.5–10.1)
CHLORIDE SERPL-SCNC: 100 MMOL/L — SIGNIFICANT CHANGE UP (ref 96–108)
CO2 SERPL-SCNC: 29 MMOL/L — SIGNIFICANT CHANGE UP (ref 22–31)
CREAT SERPL-MCNC: 0.76 MG/DL — SIGNIFICANT CHANGE UP (ref 0.5–1.3)
EGFR: 80 ML/MIN/1.73M2 — SIGNIFICANT CHANGE UP
GLUCOSE SERPL-MCNC: 108 MG/DL — HIGH (ref 70–99)
HCT VFR BLD CALC: 39.4 % — SIGNIFICANT CHANGE UP (ref 34.5–45)
HGB BLD-MCNC: 13 G/DL — SIGNIFICANT CHANGE UP (ref 11.5–15.5)
MCHC RBC-ENTMCNC: 33 GM/DL — SIGNIFICANT CHANGE UP (ref 32–36)
MCHC RBC-ENTMCNC: 33.2 PG — SIGNIFICANT CHANGE UP (ref 27–34)
MCV RBC AUTO: 100.5 FL — HIGH (ref 80–100)
NRBC # BLD: 0 /100 WBCS — SIGNIFICANT CHANGE UP (ref 0–0)
PLATELET # BLD AUTO: 226 K/UL — SIGNIFICANT CHANGE UP (ref 150–400)
POTASSIUM SERPL-MCNC: 4.5 MMOL/L — SIGNIFICANT CHANGE UP (ref 3.5–5.3)
POTASSIUM SERPL-SCNC: 4.5 MMOL/L — SIGNIFICANT CHANGE UP (ref 3.5–5.3)
PROT SERPL-MCNC: 7.7 G/DL — SIGNIFICANT CHANGE UP (ref 6–8.3)
RBC # BLD: 3.92 M/UL — SIGNIFICANT CHANGE UP (ref 3.8–5.2)
RBC # FLD: 14.3 % — SIGNIFICANT CHANGE UP (ref 10.3–14.5)
SODIUM SERPL-SCNC: 136 MMOL/L — SIGNIFICANT CHANGE UP (ref 135–145)
WBC # BLD: 7.37 K/UL — SIGNIFICANT CHANGE UP (ref 3.8–10.5)
WBC # FLD AUTO: 7.37 K/UL — SIGNIFICANT CHANGE UP (ref 3.8–10.5)

## 2024-04-17 PROCEDURE — 85027 COMPLETE CBC AUTOMATED: CPT

## 2024-04-17 PROCEDURE — 80053 COMPREHEN METABOLIC PANEL: CPT

## 2024-04-17 PROCEDURE — 93005 ELECTROCARDIOGRAM TRACING: CPT

## 2024-04-17 PROCEDURE — 36415 COLL VENOUS BLD VENIPUNCTURE: CPT

## 2024-04-17 PROCEDURE — 93010 ELECTROCARDIOGRAM REPORT: CPT

## 2024-04-17 PROCEDURE — G0463: CPT

## 2024-04-17 NOTE — H&P PST ADULT - OTHER CARE PROVIDERS
PCP- Dr Michaelle Jo / Pulmonologist Dr Diego Henry / Rheumatologist Dr Gini Alexandra / Mephrologist Dr Suk-Hyeon Yun

## 2024-04-17 NOTE — H&P PST ADULT - HISTORY OF PRESENT ILLNESS
78 year old female; Bunion Of LEFT Foot, Bunion Of RIGHT Foot, Other Hammer Toes Acquired LEFT Foot, Other Hammer Toes Acquired RIGHT Foot; presents today with her  Irvin for PST prior to Bilateral 1ST metatarsal head Resections with Dr Jericho Lei on 4/24/2024.  78 year old female; significant PMH  Rheumatoid Arthritis ( DX 1974 age 25- now with deformities both hands, Elbows and bilateral feet - wears support braces on forearms and braces to bilateral feet/ankles), Dementia (  primary caregiver and HCP), Anxiety, Hyperthyroidism, Pulmonary Fibrosis, Acid Reflux (2008), Osteoporosis (2010), UTI (since 2018), Urinary Incontinence, OCD (2018- went thru counseling never took medications), Hyponatermia (since 2020- decreased oral intake- follows with Nephrology), Dry Eyes, Nontuberculous  Mycobacteria (NTM - DX November 2022-treated and followed by Dr Diego Henry), Esophageal Stricture); now with Bunion Of LEFT Foot, Bunion Of RIGHT Foot, Other Hammer Toes Acquired LEFT Foot, Other Hammer Toes Acquired RIGHT Foot; presents today with her  Irvin for PST prior to Bilateral 1ST metatarsal head Resections with Dr Jericho Lei on 4/24/2024.      Irvin is primary caretaker. Notes prior to his wife's development of bunions she was ambulating with walker and using wheelchair when out. Now due to issues on both feet he notes she is mostly wheelchair bound and total care. Following consult, exams and discussions with Dr Lei regarding treatment options pt is electing for scheduled procedure.   78 year old female; significant PMH  Rheumatoid Arthritis ( DX 1974 age 25- now with deformities both hands, Elbows and bilateral feet - wears support braces on forearms and braces to bilateral feet/ankles), Dementia (  primary caregiver and HCP), Anxiety, Hyperthyroidism, Pulmonary Fibrosis, Acid Reflux (2008), Osteoporosis (2010), UTI (since 2018), Urinary Incontinence, OCD (2018- went thru counseling never took medications), Hyponatermia (since 2020- decreased oral intake- follows with Nephrology- as per  she is drinking 2 Ensures a day), Dry Eyes, Nontuberculous Mycobacteria (NTM - DX November 2022-treated and followed by Pulmonologist Dr Diego Henry), Esophageal Stricture); now with Bunion Of LEFT Foot, Bunion Of RIGHT Foot, Other Hammer Toes Acquired LEFT Foot, Other Hammer Toes Acquired RIGHT Foot; presents today with her  Irvin for PST prior to Bilateral 1ST Metatarsal Head Resections with Dr Jericho Lei on 4/24/2024.      Irvin is primary caretaker. Notes prior to his wife's development of bunions she was ambulating with walker and using wheelchair when out. Now due to issues on both feet he notes she is mostly wheelchair bound and total care. Following consult, exams and discussions with Dr Lei regarding treatment options pt is electing for scheduled procedure.   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      Irvin is primary caretaker. Notes prior to his wife's development of bunions she was ambulating with walker and using wheelchair when out. Now due to issues on both feet he notes she is mostly wheelchair bound and total care. Following consult, exams and discussions with Dr Lei regarding treatment options pt is electing for scheduled procedure.

## 2024-04-17 NOTE — H&P PST ADULT - NSANTHOSAYNRD_GEN_A_CORE
No. ZULEYKA screening performed.  STOP BANG Legend: 0-2 = LOW Risk; 3-4 = INTERMEDIATE Risk; 5-8 = HIGH Risk

## 2024-04-17 NOTE — H&P PST ADULT - RESPIRATORY
clear to auscultation bilaterally/airway patent/breath sounds equal/good air movement/respirations non-labored Bilateral crackles/airway patent/breath sounds equal/good air movement/respirations non-labored

## 2024-04-17 NOTE — H&P PST ADULT - NSICDXFAMILYHX_GEN_ALL_CORE_FT
FAMILY HISTORY:  Father  Still living? Unknown  Family history of arthritis, Age at diagnosis: Age Unknown    Mother  Still living? No  Family history of arthritis, Age at diagnosis: Age Unknown

## 2024-04-17 NOTE — H&P PST ADULT - MUSCULOSKELETAL COMMENTS
notes prior to development of Bunions she could walk with leg braces in place however now pt is wheelchair bound LONG HX of Rheumatoid Arthritis since age 25 - also notes Bunions/Hammer Toes  Bilateral Feet LONG HX of Rheumatoid Arthritis since age 25 - also notes Bunions/Hammer Toes  Bilateral Feet- braces to bilateral feet/ankles

## 2024-04-17 NOTE — H&P PST ADULT - NSICDXPASTSURGICALHX_GEN_ALL_CORE_FT
PAST SURGICAL HISTORY:  Arthroplasty, Knee B/L ~5 years ago    cataract extraction with IOL placement 2008    FH: total knee replacement B/L (1996)    ORIF left ankle 2008    S/P Dilatation of Esophageal Stricture/with ring in place ~2007    JANKI (Total Hip Arthroplasty) right ~ 7 yrs.    JANKI (Total Hip Arthroplasty)-left ~ 2007

## 2024-04-17 NOTE — H&P PST ADULT - MUSCULOSKELETAL
details… Bilateral bunions left foot wound dressing in place LEFT Foot - Bunion noted right foot Bilateral bunions left foot wound/bunion dressing in place LEFT Foot - Bunion noted right foot

## 2024-04-17 NOTE — H&P PST ADULT - ASSESSMENT
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

## 2024-04-17 NOTE — H&P PST ADULT - NSICDXPASTMEDICALHX_GEN_ALL_CORE_FT
PAST MEDICAL HISTORY:  Arthritis, Rheumatoid     Bunion of left foot     Bunion of right foot     Esophageal Stricture (with a Ring)    GERD (Gastroesophageal Reflux Disease)     Malfunction or internal orthopaedic device/graft     Osteoporosis     Other hammer toe(s) (acquired), left foot     Other hammer toe(s) (acquired), right foot      PAST MEDICAL HISTORY:  Acid reflux     Anxiety     Arthritis, Rheumatoid     Bilateral dry eyes     Bunion of left foot     Bunion of right foot     Dementia     Esophageal Stricture (with a Ring)    GERD (Gastroesophageal Reflux Disease)     H/O pulmonary fibrosis     H/O urinary incontinence     H/O urinary tract infection     History of OCD (obsessive compulsive disorder)     Hyperthyroidism     Hyponatremia     Malfunction or internal orthopaedic device/graft     Nontuberculous mycobacterial infection     Osteoporosis     Other hammer toe(s) (acquired), left foot     Other hammer toe(s) (acquired), right foot

## 2024-04-17 NOTE — H&P PST ADULT - GENERAL COMMENTS
Denies any travel in the last 2 weeks - denies any recent/known exposure to anyone with covid - denies any current covid symptoms

## 2024-04-17 NOTE — H&P PST ADULT - PROBLEM SELECTOR PLAN 1
No PST labs; CBC, CMP, EKG. Nephrology clearance on chart. Pt has also been seen by Rheumatology with instructions written in note regarding stopping medications for Rheumatoid Arthritis prior to PST labs; CBC, CMP, EKG. Nephrology clearance on chart. Pt has also been seen by Rheumatology with instructions written in note regarding stopping medications for Rheumatoid Arthritis prior to procedure and then one week following procedure. last Pulmonary note on chart from Dr Henry. Will ask for addendum clearing pt for upcoming surgical procedure. Medical clearance scheduled with PCP Dr Michaelle Jo on 4/18/2024. Will fax over all PST results to clearing doctors for review and clearances.     Pt instructed to stop any NSAIDS/Herbal Supplements between now and procedure. She was also instructed as per Rheumatology to stop Meohotrexate and Orencio ( her R.A. medications) between now and procedure and for a week post-op. May take Tylenol if needed for any pain between now and procedure.   Morning of procedure she may take her Methimazole with small sip of water. She may also take her Restasis Eye Drops. Pre-op instructions given to pt and  Irvin with understanding verbalized. Pt/ also instructed to wash for 3 days prior to procedure/morning of procedure with Dial Anti-Bacterial Soap. Surgical scrubs not given secondary to skin condition/left foot wound.   All questions addressed with pt and  Irvin prior to them leaving the PST department today.

## 2024-04-17 NOTE — H&P PST ADULT - PATIENT OPTIMIZED PENDING
Medical clearance with PCP Dr Michaelle Jo Medical clearance with PCP Dr Michaelle Jo scheduled on 4/18/2024 - Nephrology clearance on chart - Pt has also been seen by both Rheumatology and Pulmonary

## 2024-04-17 NOTE — H&P PST ADULT - PSYCHIATRIC
normal affect/normal behavior details… Pt was cooperative today in PST however was anxious regarding upcoming surgical procedure/normal affect/anxious

## 2024-04-18 ENCOUNTER — NON-APPOINTMENT (OUTPATIENT)
Age: 79
End: 2024-04-18

## 2024-04-18 ENCOUNTER — APPOINTMENT (OUTPATIENT)
Dept: INTERNAL MEDICINE | Facility: CLINIC | Age: 79
End: 2024-04-18
Payer: MEDICARE

## 2024-04-18 VITALS
SYSTOLIC BLOOD PRESSURE: 120 MMHG | HEART RATE: 73 BPM | DIASTOLIC BLOOD PRESSURE: 76 MMHG | OXYGEN SATURATION: 98 % | WEIGHT: 82 LBS | BODY MASS INDEX: 15.48 KG/M2 | HEIGHT: 61 IN

## 2024-04-18 DIAGNOSIS — M06.9 RHEUMATOID ARTHRITIS, UNSPECIFIED: ICD-10-CM

## 2024-04-18 DIAGNOSIS — E87.1 HYPO-OSMOLALITY AND HYPONATREMIA: ICD-10-CM

## 2024-04-18 DIAGNOSIS — J84.10 PULMONARY FIBROSIS, UNSPECIFIED: ICD-10-CM

## 2024-04-18 DIAGNOSIS — K21.9 GASTRO-ESOPHAGEAL REFLUX DISEASE W/OUT ESOPHAGITIS: ICD-10-CM

## 2024-04-18 DIAGNOSIS — Z01.818 ENCOUNTER FOR OTHER PREPROCEDURAL EXAMINATION: ICD-10-CM

## 2024-04-18 DIAGNOSIS — D47.2 MONOCLONAL GAMMOPATHY: ICD-10-CM

## 2024-04-18 PROCEDURE — 93000 ELECTROCARDIOGRAM COMPLETE: CPT

## 2024-04-18 PROCEDURE — 99214 OFFICE O/P EST MOD 30 MIN: CPT

## 2024-04-18 NOTE — ASSESSMENT
[Patient Optimized for Surgery] : Patient optimized for surgery [No Further Testing Recommended] : no further testing recommended [As per surgery] : as per surgery [FreeTextEntry4] : Ivelisse is here today for preoperative assessment for planned procedure.  She is high risk for this moderate risk procedure.  Per patient's 's report, plan is for local anesthesia with mild sedation.  Per his report, no plan for general anesthesia.  She has seen her nephrologist, pulmonologist, and rheumatologist.  Rheumatology medications as per her rheumatologist instruction.  Can take famotidine if needed.  Continue methimazole perioperatively.  Advised to avoid NSAIDs/new vitamins supplements prior to surgery.  Can use Tylenol as needed for pain.  Her EKG is unchanged and similar to previous.  She has no new cardiac symptoms.  She is at high risk for VTE given her rheumatoid arthritis and poor mobility.  Will defer DVT prophylaxis to primary team.  Encourage early ambulation.  Please see attached pulmonary, rheumatology, and nephrology consultations.  She is in the most optimal condition for proposed procedure and benefits of surgery likely outweigh risks for her.

## 2024-04-18 NOTE — ADDENDUM
[FreeTextEntry1] : Addendum April 18, 2024 Informed that patient will be having foot surgery under local anesthesia There is no pulmonary contraindication to this patient having foot surgery under local anesthesia.  Considering her severe pulmonary impairment general anesthesia should be avoided

## 2024-04-18 NOTE — RESULTS/DATA
[] : results reviewed [de-identified] : Repeat EKG in office shows mildly low voltage, poor R wave progression.

## 2024-04-18 NOTE — HISTORY OF PRESENT ILLNESS
[Never] : never [TextBox_4] : Prior: Follow-up for abnormal chest CT cavitary lesion noted.  Sputum specimens ordered.  Cultures came back here to discuss results.  Continues to report poor appetite generalized malaise and some cough.  Current: Completed course of treatment for Mycobacterium [place]ii.  No respiratory complaints at this time.

## 2024-04-18 NOTE — PHYSICAL EXAM
[No Acute Distress] : no acute distress [Normal Oropharynx] : normal oropharynx [Normal Appearance] : normal appearance [No Neck Mass] : no neck mass [Normal Rate/Rhythm] : normal rate/rhythm [Normal S1, S2] : normal s1, s2 [No Murmurs] : no murmurs [No Abnormalities] : no abnormalities [Benign] : benign [Normal Gait] : normal gait [No Cyanosis] : no cyanosis [FROM] : FROM [Normal Color/ Pigmentation] : normal color/ pigmentation [No Focal Deficits] : no focal deficits [Oriented x3] : oriented x3 [Normal Affect] : normal affect [TextBox_2] : Frail-appearing female [TextBox_68] : Bilateral crackles long term up [TextBox_105] : Marked joint changes and deviation of joints and fingers

## 2024-04-18 NOTE — ASSESSMENT
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  Will obtain CT scan  To document radiographic resolution and stability of interstitial lung disease related to underlying autoimmune disease

## 2024-04-18 NOTE — HISTORY OF PRESENT ILLNESS
[No Pertinent Cardiac History] : no history of aortic stenosis, atrial fibrillation, coronary artery disease, recent myocardial infarction, or implantable device/pacemaker [(Patient denies any chest pain, claudication, dyspnea on exertion, orthopnea, palpitations or syncope)] : Patient denies any chest pain, claudication, dyspnea on exertion, orthopnea, palpitations or syncope [Poor (<4 METs)] : Poor (<4 METs) [Smoker] : not a smoker [Family Member] : no family member with adverse anesthesia reaction/sudden death [Self] : no previous adverse anesthesia reaction [Chronic Anticoagulation] : no chronic anticoagulation [Chronic Kidney Disease] : no chronic kidney disease [Diabetes] : no diabetes [FreeTextEntry1] : Bilateral first metatarsal head resections [FreeTextEntry2] : 04/24/24 [FreeTextEntry3] : Quique  [FreeTextEntry4] : Ivelisse is here today for preoperative assessment.  She is here with her .  She is having bunion surgery bilaterally given severe pain in the feet and wounds.  She has high risk leaving her wounds untreated likely poses greater risk.  She has been at her baseline.  No chest pain, pressure or shortness of breath.  She has had surgeries before in the past and tolerated them well.  No adverse effects anesthesia.  No bleeding or VTE complications. [FreeTextEntry8] : Essentially wheelchair-bound secondary to severe RA, poor mobility

## 2024-04-18 NOTE — PHYSICAL EXAM
[No Acute Distress] : no acute distress [No Lymphadenopathy] : no lymphadenopathy [Thyroid Normal, No Nodules] : the thyroid was normal and there were no nodules present [No Respiratory Distress] : no respiratory distress  [No Accessory Muscle Use] : no accessory muscle use [Normal Rate] : normal rate  [Regular Rhythm] : with a regular rhythm [Normal S1, S2] : normal S1 and S2 [No Murmur] : no murmur heard [No Carotid Bruits] : no carotid bruits [No Abdominal Bruit] : a ~M bruit was not heard ~T in the abdomen [No Edema] : there was no peripheral edema [Soft] : abdomen soft [Non Tender] : non-tender [Non-distended] : non-distended [Normal Supraclavicular Nodes] : no supraclavicular lymphadenopathy [Normal Posterior Cervical Nodes] : no posterior cervical lymphadenopathy [Normal Anterior Cervical Nodes] : no anterior cervical lymphadenopathy [de-identified] : Frail-appearing.  Examined in wheelchair secondary to poor mobility [de-identified] : Coarse crackles throughout

## 2024-04-18 NOTE — REVIEW OF SYSTEMS
[Fever] : no fever [Chills] : no chills [Night Sweats] : no night sweats [Chest Pain] : no chest pain [Shortness Of Breath] : no shortness of breath [Abdominal Pain] : no abdominal pain [Dysuria] : no dysuria [Headache] : no headache [Dizziness] : no dizziness [Anxiety] : no anxiety [Depression] : no depression

## 2024-04-23 ENCOUNTER — TRANSCRIPTION ENCOUNTER (OUTPATIENT)
Age: 79
End: 2024-04-23

## 2024-04-24 ENCOUNTER — OUTPATIENT (OUTPATIENT)
Dept: OUTPATIENT SERVICES | Facility: HOSPITAL | Age: 79
LOS: 1 days | End: 2024-04-24
Payer: COMMERCIAL

## 2024-04-24 ENCOUNTER — APPOINTMENT (OUTPATIENT)
Dept: WOUND CARE | Facility: HOSPITAL | Age: 79
End: 2024-04-24

## 2024-04-24 ENCOUNTER — TRANSCRIPTION ENCOUNTER (OUTPATIENT)
Age: 79
End: 2024-04-24

## 2024-04-24 VITALS
HEART RATE: 66 BPM | DIASTOLIC BLOOD PRESSURE: 50 MMHG | SYSTOLIC BLOOD PRESSURE: 112 MMHG | OXYGEN SATURATION: 98 % | RESPIRATION RATE: 12 BRPM

## 2024-04-24 VITALS
WEIGHT: 80.03 LBS | DIASTOLIC BLOOD PRESSURE: 50 MMHG | HEIGHT: 61 IN | SYSTOLIC BLOOD PRESSURE: 132 MMHG | TEMPERATURE: 98 F | OXYGEN SATURATION: 100 % | HEART RATE: 73 BPM | RESPIRATION RATE: 14 BRPM

## 2024-04-24 DIAGNOSIS — M20.42 OTHER HAMMER TOE(S) (ACQUIRED), LEFT FOOT: ICD-10-CM

## 2024-04-24 DIAGNOSIS — M21.612 BUNION OF LEFT FOOT: ICD-10-CM

## 2024-04-24 DIAGNOSIS — Z84.89 FAMILY HISTORY OF OTHER SPECIFIED CONDITIONS: Chronic | ICD-10-CM

## 2024-04-24 DIAGNOSIS — M21.611 BUNION OF RIGHT FOOT: ICD-10-CM

## 2024-04-24 DIAGNOSIS — M20.41 OTHER HAMMER TOE(S) (ACQUIRED), RIGHT FOOT: ICD-10-CM

## 2024-04-24 LAB
GRAM STN FLD: SIGNIFICANT CHANGE UP
SPECIMEN SOURCE: SIGNIFICANT CHANGE UP

## 2024-04-24 PROCEDURE — 87186 SC STD MICRODIL/AGAR DIL: CPT

## 2024-04-24 PROCEDURE — 87077 CULTURE AEROBIC IDENTIFY: CPT

## 2024-04-24 PROCEDURE — 88311 DECALCIFY TISSUE: CPT | Mod: 26

## 2024-04-24 PROCEDURE — 87070 CULTURE OTHR SPECIMN AEROBIC: CPT

## 2024-04-24 PROCEDURE — 88311 DECALCIFY TISSUE: CPT

## 2024-04-24 PROCEDURE — 73630 X-RAY EXAM OF FOOT: CPT | Mod: 26,50

## 2024-04-24 PROCEDURE — 28111 PART REMOVAL OF METATARSAL: CPT | Mod: RT

## 2024-04-24 PROCEDURE — 73630 X-RAY EXAM OF FOOT: CPT

## 2024-04-24 PROCEDURE — 28111 PART REMOVAL OF METATARSAL: CPT | Mod: 50

## 2024-04-24 PROCEDURE — 88304 TISSUE EXAM BY PATHOLOGIST: CPT

## 2024-04-24 PROCEDURE — 87075 CULTR BACTERIA EXCEPT BLOOD: CPT

## 2024-04-24 PROCEDURE — 88304 TISSUE EXAM BY PATHOLOGIST: CPT | Mod: 26

## 2024-04-24 RX ORDER — ONDANSETRON 8 MG/1
4 TABLET, FILM COATED ORAL ONCE
Refills: 0 | Status: DISCONTINUED | OUTPATIENT
Start: 2024-04-24 | End: 2024-04-24

## 2024-04-24 RX ORDER — METHIMAZOLE 10 MG/1
1 TABLET ORAL
Refills: 0 | DISCHARGE

## 2024-04-24 RX ORDER — OXYCODONE HYDROCHLORIDE AND ACETAMINOPHEN 325; 5 MG/5ML; MG/5ML
5-325 SOLUTION ORAL EVERY 6 HOURS
Qty: 140 | Refills: 0 | Status: DISCONTINUED | COMMUNITY
Start: 2024-04-24 | End: 2024-04-24

## 2024-04-24 RX ORDER — SODIUM CHLORIDE 9 MG/ML
1000 INJECTION, SOLUTION INTRAVENOUS
Refills: 0 | Status: DISCONTINUED | OUTPATIENT
Start: 2024-04-24 | End: 2024-04-24

## 2024-04-24 RX ORDER — ABATACEPT 125 MG/ML
125 INJECTION, SOLUTION SUBCUTANEOUS
Refills: 0 | DISCHARGE

## 2024-04-24 RX ORDER — CEFAZOLIN SODIUM 1 G
2000 VIAL (EA) INJECTION ONCE
Refills: 0 | Status: DISCONTINUED | OUTPATIENT
Start: 2024-04-24 | End: 2024-04-24

## 2024-04-24 RX ORDER — CYCLOSPORINE 0.5 MG/ML
1 EMULSION OPHTHALMIC
Refills: 0 | DISCHARGE

## 2024-04-24 RX ORDER — CEPHALEXIN 250 MG/5ML
250 FOR SUSPENSION ORAL EVERY 6 HOURS
Qty: 2 | Refills: 0 | Status: ACTIVE | COMMUNITY
Start: 2024-04-24 | End: 1900-01-01

## 2024-04-24 RX ORDER — BUPIVACAINE 13.3 MG/ML
20 INJECTION, SUSPENSION, LIPOSOMAL INFILTRATION ONCE
Refills: 0 | Status: DISCONTINUED | OUTPATIENT
Start: 2024-04-24 | End: 2024-04-24

## 2024-04-24 RX ORDER — OXYCODONE AND ACETAMINOPHEN 5; 325 MG/1; MG/1
5-325 TABLET ORAL
Qty: 28 | Refills: 0 | Status: COMPLETED | COMMUNITY
Start: 2024-04-24 | End: 2024-05-01

## 2024-04-24 RX ORDER — FAMOTIDINE 10 MG/ML
1 INJECTION INTRAVENOUS
Refills: 0 | DISCHARGE

## 2024-04-24 RX ORDER — ACETAMINOPHEN 500 MG
2 TABLET ORAL
Refills: 0 | DISCHARGE

## 2024-04-24 RX ORDER — ACETAMINOPHEN 500 MG
550 TABLET ORAL ONCE
Refills: 0 | Status: DISCONTINUED | OUTPATIENT
Start: 2024-04-24 | End: 2024-04-24

## 2024-04-24 NOTE — ASU DISCHARGE PLAN (ADULT/PEDIATRIC) - CALL YOUR DOCTOR IF YOU HAVE ANY OF THE FOLLOWING:
Fever greater than (need to indicate Fahrenheit or Celsius)/Nausea and vomiting that does not stop/Unable to urinate/Excessive diarrhea

## 2024-04-24 NOTE — ASU DISCHARGE PLAN (ADULT/PEDIATRIC) - CARE PROVIDER_API CALL
Jericho Lei-Loyd  Foot Surgery  [address] [place] [id number]  Phone: [phone]  Fax: [phone]  Follow Up Time:

## 2024-04-24 NOTE — ASU DISCHARGE PLAN (ADULT/PEDIATRIC) - NS MD DC FALL RISK RISK
For information on Fall & Injury Prevention, visit: [email or web address] OR  [email or web address] OR  https://www.cdc.gov/steadi/patient.html

## 2024-04-29 PROBLEM — M20.41 OTHER HAMMER TOE(S) (ACQUIRED), RIGHT FOOT: Chronic | Status: ACTIVE | Noted: 2024-04-17

## 2024-04-29 PROBLEM — Z86.59 PERSONAL HISTORY OF OTHER MENTAL AND BEHAVIORAL DISORDERS: Chronic | Status: ACTIVE | Noted: 2024-04-17

## 2024-04-29 PROBLEM — M21.611 BUNION OF RIGHT FOOT: Chronic | Status: ACTIVE | Noted: 2024-04-17

## 2024-04-29 PROBLEM — A31.9 MYCOBACTERIAL INFECTION, UNSPECIFIED: Chronic | Status: ACTIVE | Noted: 2024-04-17

## 2024-04-29 PROBLEM — K21.9 GASTRO-ESOPHAGEAL REFLUX DISEASE WITHOUT ESOPHAGITIS: Chronic | Status: ACTIVE | Noted: 2024-04-17

## 2024-04-29 PROBLEM — F03.90 UNSPECIFIED DEMENTIA WITHOUT BEHAVIORAL DISTURBANCE: Chronic | Status: ACTIVE | Noted: 2024-04-17

## 2024-04-29 PROBLEM — Z87.898 PERSONAL HISTORY OF OTHER SPECIFIED CONDITIONS: Chronic | Status: ACTIVE | Noted: 2024-04-17

## 2024-04-29 PROBLEM — Z87.440 PERSONAL HISTORY OF URINARY (TRACT) INFECTIONS: Chronic | Status: ACTIVE | Noted: 2024-04-17

## 2024-04-29 PROBLEM — M21.612 BUNION OF LEFT FOOT: Chronic | Status: ACTIVE | Noted: 2024-04-17

## 2024-04-29 PROBLEM — Z87.09 PERSONAL HISTORY OF OTHER DISEASES OF THE RESPIRATORY SYSTEM: Chronic | Status: ACTIVE | Noted: 2024-04-17

## 2024-04-29 PROBLEM — F41.9 ANXIETY DISORDER, UNSPECIFIED: Chronic | Status: ACTIVE | Noted: 2024-04-17

## 2024-04-29 PROBLEM — E05.90 THYROTOXICOSIS, UNSPECIFIED WITHOUT THYROTOXIC CRISIS OR STORM: Chronic | Status: ACTIVE | Noted: 2024-04-17

## 2024-04-29 PROBLEM — M20.42 OTHER HAMMER TOE(S) (ACQUIRED), LEFT FOOT: Chronic | Status: ACTIVE | Noted: 2024-04-17

## 2024-04-29 PROBLEM — E87.1 HYPO-OSMOLALITY AND HYPONATREMIA: Chronic | Status: ACTIVE | Noted: 2024-04-17

## 2024-04-29 PROBLEM — H04.123 DRY EYE SYNDROME OF BILATERAL LACRIMAL GLANDS: Chronic | Status: ACTIVE | Noted: 2024-04-17

## 2024-04-29 LAB — GRAM STN FLD: ABNORMAL

## 2024-05-02 LAB
-  AMPHOTERICIN B: SIGNIFICANT CHANGE UP
-  ANIDULAFUNGIN: SIGNIFICANT CHANGE UP
-  CASPOFUNGIN: SIGNIFICANT CHANGE UP
-  FLUCONAZOLE: SIGNIFICANT CHANGE UP
-  MICAFUNGIN: SIGNIFICANT CHANGE UP
-  POSACONAZOLE: SIGNIFICANT CHANGE UP
-  VORICONAZOLE: SIGNIFICANT CHANGE UP
CULTURE RESULTS: ABNORMAL
METHOD TYPE: SIGNIFICANT CHANGE UP
ORGANISM # SPEC MICROSCOPIC CNT: ABNORMAL
ORGANISM # SPEC MICROSCOPIC CNT: SIGNIFICANT CHANGE UP
SPECIMEN SOURCE: SIGNIFICANT CHANGE UP

## 2024-05-03 ENCOUNTER — OUTPATIENT (OUTPATIENT)
Dept: OUTPATIENT SERVICES | Facility: HOSPITAL | Age: 79
LOS: 1 days | Discharge: HOME | End: 2024-05-03
Payer: COMMERCIAL

## 2024-05-03 ENCOUNTER — APPOINTMENT (OUTPATIENT)
Dept: WOUND CARE | Facility: HOSPITAL | Age: 79
End: 2024-05-03
Payer: MEDICARE

## 2024-05-03 DIAGNOSIS — L97.522 NON-PRESSURE CHRONIC ULCER OF OTHER PART OF LEFT FOOT WITH FAT LAYER EXPOSED: ICD-10-CM

## 2024-05-03 DIAGNOSIS — M20.42 OTHER HAMMER TOE(S) (ACQUIRED), LEFT FOOT: ICD-10-CM

## 2024-05-03 DIAGNOSIS — M20.41 OTHER HAMMER TOE(S) (ACQUIRED), RIGHT FOOT: ICD-10-CM

## 2024-05-03 DIAGNOSIS — M21.611 BUNION OF RIGHT FOOT: ICD-10-CM

## 2024-05-03 DIAGNOSIS — Z84.89 FAMILY HISTORY OF OTHER SPECIFIED CONDITIONS: Chronic | ICD-10-CM

## 2024-05-03 DIAGNOSIS — M21.612 BUNION OF LEFT FOOT: ICD-10-CM

## 2024-05-03 PROCEDURE — G0463: CPT

## 2024-05-03 PROCEDURE — 99213 OFFICE O/P EST LOW 20 MIN: CPT

## 2024-05-03 NOTE — PHYSICAL EXAM
[4 x 4] : 4 x 4  [Normal Breath Sounds] : Normal breath sounds [Normal Heart Sounds] : normal heart sounds [2+] : left 2+ [Ankle Swelling (On Exam)] : not present [Ankle Swelling Bilaterally] : bilaterally  [Varicose Veins Of Lower Extremities] : bilaterally [] : bilaterally [Alert] : alert [Oriented to Person] : oriented to person [Oriented to Place] : oriented to place [Oriented to Time] : oriented to time [Calm] : calm [de-identified] : Well-developed, Well-nourished in no acute distress. [de-identified] : Within normal limits. [de-identified] : Within normal limits. [de-identified] : Bilateral suture lines are clean, intact, no drainage, no odor, no acute infection, periwound skin is intact with no cellulitis. [FreeTextEntry1] : lateral great toe incision line [FreeTextEntry2] : 3.0 [FreeTextEntry3] : 0.1 [de-identified] : serousanguoinous [de-identified] : xeroform [de-identified] : suture line intact sutures in place gauze abdominal pad kerlix light ace Do not remove [FreeTextEntry7] : lateral great toe [FreeTextEntry8] : 3.5 [FreeTextEntry9] : 0.1 [de-identified] : 0.1 [de-identified] : xeroform [de-identified] : sutures in place  y shaped gauze abdominal pad kerlix light ace Do not remove [TWNoteComboBox1] : Right [TWNoteComboBox4] : Small [TWNoteComboBox6] : Surgical [TWNoteComboBox9] : Left [de-identified] : Surgical

## 2024-05-03 NOTE — PLAN
[FreeTextEntry1] : Xeroform, Dry dressing, return to office in one week. 25 minutes spent for patient care and medical decision making.

## 2024-05-03 NOTE — VITALS
[Pain related to present condition?] : The patient's  pain is related to present condition. [] : No [de-identified] : 4/10 left foot [FreeTextEntry3] : left foot [FreeTextEntry1] : rest [FreeTextEntry2] : standing pain may go up to a 6

## 2024-05-03 NOTE — ASSESSMENT
[Verbal] : Verbal [Spouse] : Spouse [Family member] : Family member [Fair - mild discomfort, physical impairment, low acceptance] : Fair - mild discomfort, physical impairment, low acceptance [Verbalizes knowledge/Understanding] : Verbalizes knowledge/understanding [Skin Care] : skin care [Signs and symptoms of infection] : sign and symptoms of infection [How and When to Call] : how and when to call [Pain Management] : pain management [Stable] : stable [Home] : Home [Wheelchair] : Wheelchair [FreeTextEntry2] : prevent infection Increase nutrition Control pain  [FreeTextEntry3] : 1st post op visit doing well [FreeTextEntry4] : 1st post op visit doing well Follow up with Dr Lei in 1 week [FreeTextEntry1] : S/P bunion surgery, incision line is clean, no infection.

## 2024-05-03 NOTE — REASON FOR VISIT
[Spouse] : spouse [Other: _____] : [unfilled] [Follow-Up: _____] : a [unfilled] follow-up visit [FreeTextEntry1] : Patient presented in wheelchair with family. Patient alert and oriented x 3 but can get confused as per daughter Patient had bilateral bunion surgery last wednesday April 24th by Dr Quique Nixon arthritis pain

## 2024-05-05 DIAGNOSIS — Z82.49 FAMILY HISTORY OF ISCHEMIC HEART DISEASE AND OTHER DISEASES OF THE CIRCULATORY SYSTEM: ICD-10-CM

## 2024-05-05 DIAGNOSIS — Z87.440 PERSONAL HISTORY OF URINARY (TRACT) INFECTIONS: ICD-10-CM

## 2024-05-05 DIAGNOSIS — M81.0 AGE-RELATED OSTEOPOROSIS WITHOUT CURRENT PATHOLOGICAL FRACTURE: ICD-10-CM

## 2024-05-05 DIAGNOSIS — M21.612 BUNION OF LEFT FOOT: ICD-10-CM

## 2024-05-05 DIAGNOSIS — F41.9 ANXIETY DISORDER, UNSPECIFIED: ICD-10-CM

## 2024-05-05 DIAGNOSIS — E87.1 HYPO-OSMOLALITY AND HYPONATREMIA: ICD-10-CM

## 2024-05-05 DIAGNOSIS — M21.611 BUNION OF RIGHT FOOT: ICD-10-CM

## 2024-05-05 DIAGNOSIS — Z98.890 OTHER SPECIFIED POSTPROCEDURAL STATES: ICD-10-CM

## 2024-05-05 DIAGNOSIS — Z96.659 PRESENCE OF UNSPECIFIED ARTIFICIAL KNEE JOINT: ICD-10-CM

## 2024-05-05 DIAGNOSIS — Z96.649 PRESENCE OF UNSPECIFIED ARTIFICIAL HIP JOINT: ICD-10-CM

## 2024-05-05 DIAGNOSIS — Z79.899 OTHER LONG TERM (CURRENT) DRUG THERAPY: ICD-10-CM

## 2024-05-05 DIAGNOSIS — K21.9 GASTRO-ESOPHAGEAL REFLUX DISEASE WITHOUT ESOPHAGITIS: ICD-10-CM

## 2024-05-05 DIAGNOSIS — Z86.16 PERSONAL HISTORY OF COVID-19: ICD-10-CM

## 2024-05-05 DIAGNOSIS — M06.9 RHEUMATOID ARTHRITIS, UNSPECIFIED: ICD-10-CM

## 2024-05-05 DIAGNOSIS — M20.42 OTHER HAMMER TOE(S) (ACQUIRED), LEFT FOOT: ICD-10-CM

## 2024-05-05 DIAGNOSIS — L97.522 NON-PRESSURE CHRONIC ULCER OF OTHER PART OF LEFT FOOT WITH FAT LAYER EXPOSED: ICD-10-CM

## 2024-05-05 DIAGNOSIS — M20.41 OTHER HAMMER TOE(S) (ACQUIRED), RIGHT FOOT: ICD-10-CM

## 2024-05-05 DIAGNOSIS — J98.4 OTHER DISORDERS OF LUNG: ICD-10-CM

## 2024-05-10 ENCOUNTER — OUTPATIENT (OUTPATIENT)
Dept: OUTPATIENT SERVICES | Facility: HOSPITAL | Age: 79
LOS: 1 days | Discharge: HOME | End: 2024-05-10
Payer: COMMERCIAL

## 2024-05-10 ENCOUNTER — APPOINTMENT (OUTPATIENT)
Dept: WOUND CARE | Facility: HOSPITAL | Age: 79
End: 2024-05-10
Payer: MEDICARE

## 2024-05-10 VITALS
BODY MASS INDEX: 15.48 KG/M2 | RESPIRATION RATE: 18 BRPM | WEIGHT: 82 LBS | DIASTOLIC BLOOD PRESSURE: 83 MMHG | SYSTOLIC BLOOD PRESSURE: 133 MMHG | HEIGHT: 61 IN | HEART RATE: 70 BPM | OXYGEN SATURATION: 98 % | TEMPERATURE: 97.3 F

## 2024-05-10 DIAGNOSIS — L97.522 NON-PRESSURE CHRONIC ULCER OF OTHER PART OF LEFT FOOT WITH FAT LAYER EXPOSED: ICD-10-CM

## 2024-05-10 DIAGNOSIS — Z84.89 FAMILY HISTORY OF OTHER SPECIFIED CONDITIONS: Chronic | ICD-10-CM

## 2024-05-10 PROCEDURE — G0463: CPT

## 2024-05-10 PROCEDURE — 99024 POSTOP FOLLOW-UP VISIT: CPT

## 2024-05-16 ENCOUNTER — OUTPATIENT (OUTPATIENT)
Dept: OUTPATIENT SERVICES | Facility: HOSPITAL | Age: 79
LOS: 1 days | Discharge: HOME | End: 2024-05-16
Payer: MEDICARE

## 2024-05-16 ENCOUNTER — NON-APPOINTMENT (OUTPATIENT)
Age: 79
End: 2024-05-16

## 2024-05-16 ENCOUNTER — APPOINTMENT (OUTPATIENT)
Dept: WOUND CARE | Facility: HOSPITAL | Age: 79
End: 2024-05-16
Payer: MEDICARE

## 2024-05-16 VITALS
HEART RATE: 75 BPM | OXYGEN SATURATION: 95 % | SYSTOLIC BLOOD PRESSURE: 136 MMHG | RESPIRATION RATE: 18 BRPM | WEIGHT: 82 LBS | TEMPERATURE: 98.6 F | BODY MASS INDEX: 15.48 KG/M2 | DIASTOLIC BLOOD PRESSURE: 64 MMHG | HEIGHT: 61 IN

## 2024-05-16 DIAGNOSIS — L97.522 NON-PRESSURE CHRONIC ULCER OF OTHER PART OF LEFT FOOT WITH FAT LAYER EXPOSED: ICD-10-CM

## 2024-05-16 DIAGNOSIS — Z84.89 FAMILY HISTORY OF OTHER SPECIFIED CONDITIONS: Chronic | ICD-10-CM

## 2024-05-16 DIAGNOSIS — T81.89XD OTHER COMPLICATIONS OF PROCEDURES, NOT ELSEWHERE CLASSIFIED, SUBSEQUENT ENCOUNTER: ICD-10-CM

## 2024-05-16 PROCEDURE — G0463: CPT

## 2024-05-16 PROCEDURE — 99024 POSTOP FOLLOW-UP VISIT: CPT

## 2024-05-23 DIAGNOSIS — M06.9 RHEUMATOID ARTHRITIS, UNSPECIFIED: ICD-10-CM

## 2024-05-23 DIAGNOSIS — Z96.649 PRESENCE OF UNSPECIFIED ARTIFICIAL HIP JOINT: ICD-10-CM

## 2024-05-23 DIAGNOSIS — M81.0 AGE-RELATED OSTEOPOROSIS WITHOUT CURRENT PATHOLOGICAL FRACTURE: ICD-10-CM

## 2024-05-23 DIAGNOSIS — Z86.16 PERSONAL HISTORY OF COVID-19: ICD-10-CM

## 2024-05-23 DIAGNOSIS — K21.9 GASTRO-ESOPHAGEAL REFLUX DISEASE WITHOUT ESOPHAGITIS: ICD-10-CM

## 2024-05-23 DIAGNOSIS — E87.1 HYPO-OSMOLALITY AND HYPONATREMIA: ICD-10-CM

## 2024-05-23 DIAGNOSIS — Z98.890 OTHER SPECIFIED POSTPROCEDURAL STATES: ICD-10-CM

## 2024-05-23 DIAGNOSIS — J98.4 OTHER DISORDERS OF LUNG: ICD-10-CM

## 2024-05-23 DIAGNOSIS — Z82.49 FAMILY HISTORY OF ISCHEMIC HEART DISEASE AND OTHER DISEASES OF THE CIRCULATORY SYSTEM: ICD-10-CM

## 2024-05-23 DIAGNOSIS — Z79.899 OTHER LONG TERM (CURRENT) DRUG THERAPY: ICD-10-CM

## 2024-05-23 DIAGNOSIS — F41.9 ANXIETY DISORDER, UNSPECIFIED: ICD-10-CM

## 2024-05-23 DIAGNOSIS — T81.89XD OTHER COMPLICATIONS OF PROCEDURES, NOT ELSEWHERE CLASSIFIED, SUBSEQUENT ENCOUNTER: ICD-10-CM
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
## 2024-05-23 DIAGNOSIS — Z87.440 PERSONAL HISTORY OF URINARY (TRACT) INFECTIONS: ICD-10-CM
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
## 2024-05-23 DIAGNOSIS — Z96.659 PRESENCE OF UNSPECIFIED ARTIFICIAL KNEE JOINT: ICD-10-CM

## 2024-05-23 NOTE — PHYSICAL EXAM
[2+] : left 2+ [Skin Ulcer] : ulcer [de-identified] : all sutures removed by DPM  [FreeTextEntry2] : 3.0 [FreeTextEntry3] : 0.1 [de-identified] : serousanguoinous [FreeTextEntry8] : 3.5 [FreeTextEntry9] : 0.1 [de-identified] : 0.1 [de-identified] : Dry Dressing [de-identified] : Weekly [de-identified] : Primary Dressing [de-identified] : Weekly [de-identified] : Primary Dressing [de-identified] : Anxious [de-identified] : Status post bilateral first metatarsal head resections [de-identified] : Bilateral incisions intact with sutures, no dehiscence, no purulence, no proximal streaking, no fluctuance, no signs of infection. [de-identified] : Light touch sensation intact bilaterally [FreeTextEntry1] : Closed [de-identified] : none [de-identified] : Mechanically cleansed with 4x4 sterile gauze and 0.9% normal saline [de-identified] : 1 remaining suture removed by DPM  [FreeTextEntry7] : Closed [de-identified] : Mechanically cleansed with 4x4 sterile gauze and 0.9% normal saline [TWNoteComboBox1] : Right [TWNoteComboBox4] : None [TWNoteComboBox5] : No [TWNoteComboBox6] : Surgical [de-identified] : No [de-identified] : Normal [de-identified] : None [TWNoteComboBox9] : Left [de-identified] : None [de-identified] : No [de-identified] : Surgical [de-identified] : No [de-identified] : Normal [de-identified] : None [de-identified] : None

## 2024-05-23 NOTE — PLAN
[FreeTextEntry1] : Patient examined and evaluated at this time.  Patient's  and patient's son advised regarding postoperative healing process.  All sutures removed at this time without complications.  Continue with local wound care and offloading.  Patient to follow-up in 1 week.  Spent 20 minutes on patient care and medical decision making.

## 2024-05-23 NOTE — ASSESSMENT
[Verbal] : Verbal [Spouse] : Spouse [Family member] : Family member [Verbalizes knowledge/Understanding] : Verbalizes knowledge/understanding [Skin Care] : skin care [Signs and symptoms of infection] : sign and symptoms of infection [Nutrition] : nutrition [How and When to Call] : how and when to call [Pain Management] : pain management [Stable] : stable [Home] : Home [Wheelchair] : Wheelchair [Poor - depression, low motivation, poor retention, non-acceptance of disease] : Poor - depression, low motivation, poor retention, non-acceptance of disease [Written] : Written [Demo] : Demo [Foot Care] : foot care [Pressure relief] : pressure relief [Patient responsibility to plan of care] : patient responsibility to plan of care [Other: ____] : [unfilled] [] : Yes [Not Applicable - Long Term Care/Home Health Agency] : Long Term Care/Home Health Agency: Not Applicable [FreeTextEntry1] : Hx: dementia  [FreeTextEntry2] : Infection prevention Hygiene Skin integrity Surgical site care demonstrates use of both pharmacological and non pharmacological pain management interventions optimize nutritional status for improved wound healing   [FreeTextEntry3] : All wounds healed [FreeTextEntry4] : DPM removed the remaining suture removed from the left foot.  Pt advised to shower and clean feet. Skin cream can be used after cleaning the feet. Spouse provided with the Physician access line to call for a referral for at home Physical therapy. Pt reminded to begin walking by the DPM F/U as needed per the DPM

## 2024-05-23 NOTE — HISTORY OF PRESENT ILLNESS
[FreeTextEntry1] : Patient with history or RA since the age of 25 has open wound to the left foot medial bunion site. pt's  states it started to become worst 2 weeks ago. They have been using topical Mupirocin but  states that he feels it makes wound worst due to the moisture it adds. 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 Also has history of Cardiogenic pulmonary edema.  states that she has a " Blood infection " and is following up with MD tomorrow. Patient accompanied by spouse.  Patient seen today for severe bilateral hallux abductovalgus deformity as well as left first metatarsal head open wound down to skin, subcutaneous tissue, fat.  5/10/24 patient seen status post bilateral first metatarsal head resections, healing well

## 2024-05-23 NOTE — VITALS
[Pain related to present condition?] : The patient's  pain is related to present condition. [] : No [de-identified] : 4/10 left foot [FreeTextEntry3] : left foot [FreeTextEntry1] : rest [FreeTextEntry2] : standing pain may go up to a 6

## 2024-05-24 PROBLEM — T81.89XD DISCHARGE FROM SUTURE LINE, SUBSEQUENT ENCOUNTER: Status: ACTIVE | Noted: 2024-05-23

## 2024-05-24 NOTE — VITALS
[Pain related to present condition?] : The patient's  pain is related to present condition. [Sharp] : sharp [] : No [de-identified] : 8/10 [FreeTextEntry3] : bilateral feet [FreeTextEntry1] : Not walking [FreeTextEntry2] : Walking [FreeTextEntry4] : Elevation

## 2024-05-24 NOTE — HISTORY OF PRESENT ILLNESS
[FreeTextEntry1] : Patient with history or RA since the age of 25 has open wound to the left foot medial bunion site. pt's  states it started to become worst 2 weeks ago. They have been using topical Mupirocin but  states that he feels it makes wound worst due to the moisture it adds. 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 Also has history of Cardiogenic pulmonary edema.  states that she has a " Blood infection " and is following up with MD tomorrow. Patient accompanied by spouse.  Patient seen today for severe bilateral hallux abductovalgus deformity as well as left first metatarsal head open wound down to skin, subcutaneous tissue, fat.  5/10/24 patient seen status post bilateral first metatarsal head resections, healing well 5/16/24 patient seen status post bilateral first metatarsal head resections, incisions remain healed

## 2024-05-24 NOTE — PHYSICAL EXAM
[2+] : left 2+ [Skin Ulcer] : ulcer [de-identified] : Anxious [de-identified] : Status post bilateral first metatarsal head resections [de-identified] : Bilateral incisions intact, no dehiscence, no purulence, no proximal streaking, no fluctuance, no signs of infection. [de-identified] : Light touch sensation intact bilaterally [FreeTextEntry1] : Left lateral foot- 1 remaining suture removed- closed [de-identified] : None [FreeTextEntry7] : Right lateral foot-  incision closed [de-identified] : None [TWNoteComboBox4] : None [TWNoteComboBox5] : No [TWNoteComboBox6] : Surgical [de-identified] : No [de-identified] : Normal [de-identified] : None [de-identified] : None [de-identified] : 100% [de-identified] : No [de-identified] : None [de-identified] : No [de-identified] : Surgical [de-identified] : No [de-identified] : Normal [de-identified] : None [de-identified] : None [de-identified] : 100% [de-identified] : No

## 2024-05-24 NOTE — ASSESSMENT
[Verbal] : Verbal [Demo] : Demo [Patient] : Patient [Spouse] : Spouse [Fair - mild discomfort, physical impairment, low acceptance] : Fair - mild discomfort, physical impairment, low acceptance [Verbalizes knowledge/Understanding] : Verbalizes knowledge/understanding [Foot Care] : foot care [Skin Care] : skin care [Pressure relief] : pressure relief [Signs and symptoms of infection] : sign and symptoms of infection [Nutrition] : nutrition [How and When to Call] : how and when to call [Pain Management] : pain management [Patient responsibility to plan of care] : patient responsibility to plan of care [Stable] : stable [Home] : Home [Wheelchair] : Wheelchair [Not Applicable - Long Term Care/Home Health Agency] : Long Term Care/Home Health Agency: Not Applicable [Other: ____] : [unfilled] [] : Yes [FreeTextEntry1] : Pt and her  were educated on the importance of cleaning the feet and walking to improve muscle strength. The pt is unwilling to ambulate due to pain- DPM assessed the pt and her orthotic shoes and advised the pt to ambulate as tolerated, PT ordered [FreeTextEntry2] : Infection prevention Pain management- pharmacological and non pharmacological Ambulation to prevent muscle atrophy Physical therapy Nutrition Foot care Importance of F/U with primary medical care irasema [FreeTextEntry4] : Physician access line contact number provided to the pts  for at home PT. Pt and her  educated on the need for foot care/cleaning. Pt advised to shower and cleanse the feet while in the shower. DPM discussed need to ambulate with the pt to prevent muscle atrophy. F/U as needed per the DPM [FreeTextEntry3] : Wounds closed

## 2024-05-24 NOTE — PLAN
[FreeTextEntry1] : Patient examined and evaluated at this time.  Patient's  and patient advised regarding postoperative healing process. Continue with local wound care and offloading.  Patient to follow-up in 1 week.  Spent 20 minutes on patient care and medical decision making.

## 2024-06-03 ENCOUNTER — OUTPATIENT (OUTPATIENT)
Dept: OUTPATIENT SERVICES | Facility: HOSPITAL | Age: 79
LOS: 1 days | End: 2024-06-03
Payer: COMMERCIAL

## 2024-06-03 ENCOUNTER — APPOINTMENT (OUTPATIENT)
Dept: CT IMAGING | Facility: CLINIC | Age: 79
End: 2024-06-03
Payer: MEDICARE

## 2024-06-03 DIAGNOSIS — Z84.89 FAMILY HISTORY OF OTHER SPECIFIED CONDITIONS: Chronic | ICD-10-CM

## 2024-06-03 DIAGNOSIS — J84.10 PULMONARY FIBROSIS, UNSPECIFIED: ICD-10-CM

## 2024-06-03 DIAGNOSIS — J98.4 OTHER DISORDERS OF LUNG: ICD-10-CM

## 2024-06-03 PROCEDURE — 71250 CT THORAX DX C-: CPT | Mod: 26

## 2024-06-03 PROCEDURE — 71250 CT THORAX DX C-: CPT

## 2024-06-13 ENCOUNTER — RX RENEWAL (OUTPATIENT)
Age: 79
End: 2024-06-13

## 2024-06-13 DIAGNOSIS — N32.81 OVERACTIVE BLADDER: ICD-10-CM

## 2024-06-13 RX ORDER — MIRABEGRON 25 MG/1
25 TABLET, EXTENDED RELEASE ORAL
Qty: 30 | Refills: 6 | Status: ACTIVE | COMMUNITY
Start: 2024-03-05 | End: 1900-01-01

## 2024-06-13 RX ORDER — MULTIVITAMIN
TABLET ORAL
Qty: 90 | Refills: 3 | Status: ACTIVE | COMMUNITY
Start: 2023-09-09 | End: 1900-01-01

## 2024-06-24 DIAGNOSIS — Z51.81 ENCOUNTER FOR THERAPEUTIC DRUG LVL MONITORING: ICD-10-CM

## 2024-06-24 DIAGNOSIS — Z79.899 OTHER LONG TERM (CURRENT) DRUG THERAPY: ICD-10-CM

## 2024-06-24 DIAGNOSIS — M06.9 RHEUMATOID ARTHRITIS, UNSPECIFIED: ICD-10-CM

## 2024-07-02 LAB
ALBUMIN SERPL ELPH-MCNC: 4.3 G/DL
ALP BLD-CCNC: 66 U/L
ALT SERPL-CCNC: 11 U/L
ANION GAP SERPL CALC-SCNC: 11 MMOL/L
AST SERPL-CCNC: 26 U/L
BILIRUB SERPL-MCNC: 0.4 MG/DL
BUN SERPL-MCNC: 12 MG/DL
CALCIUM SERPL-MCNC: 9.4 MG/DL
CHLORIDE SERPL-SCNC: 93 MMOL/L
CO2 SERPL-SCNC: 24 MMOL/L
CREAT SERPL-MCNC: 0.65 MG/DL
CRP SERPL-MCNC: <3 MG/L
EGFR: 90 ML/MIN/1.73M2
ERYTHROCYTE [SEDIMENTATION RATE] IN BLOOD BY WESTERGREN METHOD: 37 MM/HR
HAV IGM SER QL: NONREACTIVE
HBV CORE IGG+IGM SER QL: NONREACTIVE
HBV CORE IGM SER QL: NONREACTIVE
HBV SURFACE AG SER QL: NONREACTIVE
HCT VFR BLD CALC: 35 %
HCV AB SER QL: NONREACTIVE
HCV S/CO RATIO: 0.1 S/CO
HGB BLD-MCNC: 11.6 G/DL
MCHC RBC-ENTMCNC: 32.7 PG
MCHC RBC-ENTMCNC: 33.1 GM/DL
MCV RBC AUTO: 98.6 FL
PLATELET # BLD AUTO: 203 K/UL
POTASSIUM SERPL-SCNC: 4.8 MMOL/L
PROT SERPL-MCNC: 6.9 G/DL
RBC # BLD: 3.55 M/UL
RBC # FLD: 14.3 %
SODIUM SERPL-SCNC: 128 MMOL/L
WBC # FLD AUTO: 7.95 K/UL

## 2024-07-10 ENCOUNTER — APPOINTMENT (OUTPATIENT)
Dept: PULMONOLOGY | Facility: CLINIC | Age: 79
End: 2024-07-10
Payer: MEDICARE

## 2024-07-10 ENCOUNTER — APPOINTMENT (OUTPATIENT)
Dept: RHEUMATOLOGY | Facility: CLINIC | Age: 79
End: 2024-07-10
Payer: MEDICARE

## 2024-07-10 VITALS
HEIGHT: 61 IN | WEIGHT: 82 LBS | OXYGEN SATURATION: 99 % | SYSTOLIC BLOOD PRESSURE: 124 MMHG | HEART RATE: 75 BPM | BODY MASS INDEX: 15.48 KG/M2 | DIASTOLIC BLOOD PRESSURE: 71 MMHG

## 2024-07-10 DIAGNOSIS — M06.9 RHEUMATOID ARTHRITIS, UNSPECIFIED: ICD-10-CM

## 2024-07-10 DIAGNOSIS — Z51.81 ENCOUNTER FOR THERAPEUTIC DRUG LVL MONITORING: ICD-10-CM

## 2024-07-10 DIAGNOSIS — F41.9 ANXIETY DISORDER, UNSPECIFIED: ICD-10-CM

## 2024-07-10 DIAGNOSIS — A31.0 PULMONARY MYCOBACTERIAL INFECTION: ICD-10-CM

## 2024-07-10 DIAGNOSIS — J98.4 OTHER DISORDERS OF LUNG: ICD-10-CM

## 2024-07-10 DIAGNOSIS — Z79.899 OTHER LONG TERM (CURRENT) DRUG THERAPY: ICD-10-CM

## 2024-07-10 DIAGNOSIS — J84.10 PULMONARY FIBROSIS, UNSPECIFIED: ICD-10-CM

## 2024-07-10 PROCEDURE — G2211 COMPLEX E/M VISIT ADD ON: CPT

## 2024-07-10 PROCEDURE — 99213 OFFICE O/P EST LOW 20 MIN: CPT

## 2024-07-10 PROCEDURE — 99214 OFFICE O/P EST MOD 30 MIN: CPT

## 2024-07-10 RX ORDER — SUCRALFATE 1 G/10ML
1 SUSPENSION ORAL 4 TIMES DAILY
Qty: 1 | Refills: 0 | Status: ACTIVE | COMMUNITY
Start: 2024-07-10 | End: 1900-01-01

## 2024-09-20 ENCOUNTER — APPOINTMENT (OUTPATIENT)
Dept: INTERNAL MEDICINE | Facility: CLINIC | Age: 79
End: 2024-09-20
Payer: MEDICARE

## 2024-09-20 VITALS
HEART RATE: 68 BPM | SYSTOLIC BLOOD PRESSURE: 133 MMHG | RESPIRATION RATE: 12 BRPM | WEIGHT: 82 LBS | DIASTOLIC BLOOD PRESSURE: 64 MMHG | BODY MASS INDEX: 15.48 KG/M2 | HEIGHT: 61 IN

## 2024-09-20 DIAGNOSIS — Z23 ENCOUNTER FOR IMMUNIZATION: ICD-10-CM

## 2024-09-20 DIAGNOSIS — L81.9 DISORDER OF PIGMENTATION, UNSPECIFIED: ICD-10-CM

## 2024-09-20 DIAGNOSIS — N32.81 OVERACTIVE BLADDER: ICD-10-CM

## 2024-09-20 DIAGNOSIS — R26.89 OTHER ABNORMALITIES OF GAIT AND MOBILITY: ICD-10-CM

## 2024-09-20 PROCEDURE — G0008: CPT

## 2024-09-20 PROCEDURE — 90662 IIV NO PRSV INCREASED AG IM: CPT

## 2024-09-20 PROCEDURE — 99213 OFFICE O/P EST LOW 20 MIN: CPT

## 2024-09-20 PROCEDURE — G2211 COMPLEX E/M VISIT ADD ON: CPT

## 2024-09-20 NOTE — PHYSICAL EXAM
[No Acute Distress] : no acute distress [Well Nourished] : well nourished [Well Developed] : well developed [Well-Appearing] : well-appearing [No Respiratory Distress] : no respiratory distress  [No Accessory Muscle Use] : no accessory muscle use [Clear to Auscultation] : lungs were clear to auscultation bilaterally [Normal Rate] : normal rate  [Regular Rhythm] : with a regular rhythm [Normal S1, S2] : normal S1 and S2 [No Murmur] : no murmur heard [No Edema] : there was no peripheral edema [de-identified] : Hyperpigmented macules bilateral ankles and feet

## 2024-09-20 NOTE — HISTORY OF PRESENT ILLNESS
[FreeTextEntry1] : f/u  [de-identified] : Ivelisse is here today for follow up of fall.  He fell while in the restroom about 1 month ago.  She landed on shower chair which tipped.  She was seen at urgent care and evaluated.  She has since recovered.  Reports balance has been worse since then.  No other falls.  Does have some relief after bunion surgery.  Continues to have GERD.

## 2024-09-20 NOTE — ASSESSMENT
[FreeTextEntry1] : 1.  Fall After much consideration and discussion, she agrees to try home PT.  Hopefully this will open door for other home services in future  2.  GERD -Continue famotidine.  Encouraged adherence.  Refilled famotidine and sucralfate  Chronic 1.  Subclinical hyperthyroidism  2.  Hyponatremia  3.  RA On methotrexate and Orencia  4.  MGUS  5.  Anxiety/OCD  Flu shot today Follow-up 4 months

## 2024-10-15 ENCOUNTER — APPOINTMENT (OUTPATIENT)
Dept: UROGYNECOLOGY | Facility: CLINIC | Age: 79
End: 2024-10-15

## 2024-10-15 VITALS
BODY MASS INDEX: 15.48 KG/M2 | OXYGEN SATURATION: 97 % | DIASTOLIC BLOOD PRESSURE: 72 MMHG | HEART RATE: 70 BPM | WEIGHT: 82 LBS | TEMPERATURE: 95.8 F | RESPIRATION RATE: 16 BRPM | SYSTOLIC BLOOD PRESSURE: 108 MMHG | HEIGHT: 61 IN

## 2024-10-15 PROCEDURE — 99213 OFFICE O/P EST LOW 20 MIN: CPT

## 2024-10-15 PROCEDURE — 51798 US URINE CAPACITY MEASURE: CPT

## 2024-10-18 LAB — BACTERIA UR CULT: NORMAL

## 2024-10-23 ENCOUNTER — RX RENEWAL (OUTPATIENT)
Age: 79
End: 2024-10-23

## 2024-11-01 ENCOUNTER — LABORATORY RESULT (OUTPATIENT)
Age: 79
End: 2024-11-01

## 2024-11-04 ENCOUNTER — LABORATORY RESULT (OUTPATIENT)
Age: 79
End: 2024-11-04

## 2024-11-05 DIAGNOSIS — N39.0 URINARY TRACT INFECTION, SITE NOT SPECIFIED: ICD-10-CM

## 2024-11-05 RX ORDER — NITROFURANTOIN (MONOHYDRATE/MACROCRYSTALS) 25; 75 MG/1; MG/1
100 CAPSULE ORAL
Qty: 10 | Refills: 0 | Status: ACTIVE | COMMUNITY
Start: 2024-11-05 | End: 1900-01-01

## 2024-11-11 RX ORDER — MIRABEGRON 25 MG/1
25 TABLET, FILM COATED, EXTENDED RELEASE ORAL
Qty: 30 | Refills: 10 | Status: ACTIVE | COMMUNITY
Start: 2024-11-11 | End: 1900-01-01

## 2024-11-13 ENCOUNTER — APPOINTMENT (OUTPATIENT)
Dept: RHEUMATOLOGY | Facility: CLINIC | Age: 79
End: 2024-11-13
Payer: MEDICARE

## 2024-11-13 ENCOUNTER — APPOINTMENT (OUTPATIENT)
Dept: PULMONOLOGY | Facility: CLINIC | Age: 79
End: 2024-11-13
Payer: MEDICARE

## 2024-11-13 VITALS — DIASTOLIC BLOOD PRESSURE: 50 MMHG | HEIGHT: 61 IN | SYSTOLIC BLOOD PRESSURE: 111 MMHG

## 2024-11-13 VITALS — BODY MASS INDEX: 15.68 KG/M2 | WEIGHT: 83 LBS

## 2024-11-13 VITALS — OXYGEN SATURATION: 99 % | HEART RATE: 62 BPM

## 2024-11-13 DIAGNOSIS — A31.0 PULMONARY MYCOBACTERIAL INFECTION: ICD-10-CM

## 2024-11-13 DIAGNOSIS — J84.10 PULMONARY FIBROSIS, UNSPECIFIED: ICD-10-CM

## 2024-11-13 DIAGNOSIS — Z79.899 OTHER LONG TERM (CURRENT) DRUG THERAPY: ICD-10-CM

## 2024-11-13 DIAGNOSIS — M06.9 RHEUMATOID ARTHRITIS, UNSPECIFIED: ICD-10-CM

## 2024-11-13 DIAGNOSIS — J98.4 OTHER DISORDERS OF LUNG: ICD-10-CM

## 2024-11-13 DIAGNOSIS — Z51.81 ENCOUNTER FOR THERAPEUTIC DRUG LVL MONITORING: ICD-10-CM

## 2024-11-13 DIAGNOSIS — Z01.818 ENCOUNTER FOR OTHER PREPROCEDURAL EXAMINATION: ICD-10-CM

## 2024-11-13 DIAGNOSIS — M48.02 SPINAL STENOSIS, CERVICAL REGION: ICD-10-CM

## 2024-11-13 PROCEDURE — 71046 X-RAY EXAM CHEST 2 VIEWS: CPT

## 2024-11-13 PROCEDURE — G2211 COMPLEX E/M VISIT ADD ON: CPT

## 2024-11-13 PROCEDURE — 99214 OFFICE O/P EST MOD 30 MIN: CPT

## 2024-11-13 PROCEDURE — 99213 OFFICE O/P EST LOW 20 MIN: CPT

## 2024-11-14 DIAGNOSIS — F41.9 ANXIETY DISORDER, UNSPECIFIED: ICD-10-CM

## 2024-11-14 RX ORDER — ESCITALOPRAM OXALATE 5 MG/1
5 TABLET ORAL DAILY
Qty: 30 | Refills: 3 | Status: ACTIVE | COMMUNITY
Start: 2024-11-14 | End: 1900-01-01

## 2024-11-15 DIAGNOSIS — R30.0 DYSURIA: ICD-10-CM

## 2024-11-26 ENCOUNTER — APPOINTMENT (OUTPATIENT)
Dept: INTERNAL MEDICINE | Facility: CLINIC | Age: 79
End: 2024-11-26
Payer: MEDICARE

## 2024-11-26 DIAGNOSIS — K21.9 GASTRO-ESOPHAGEAL REFLUX DISEASE W/OUT ESOPHAGITIS: ICD-10-CM

## 2024-11-26 DIAGNOSIS — R04.2 HEMOPTYSIS: ICD-10-CM

## 2024-11-26 DIAGNOSIS — F41.9 ANXIETY DISORDER, UNSPECIFIED: ICD-10-CM

## 2024-11-26 PROCEDURE — G2211 COMPLEX E/M VISIT ADD ON: CPT

## 2024-11-26 PROCEDURE — 99214 OFFICE O/P EST MOD 30 MIN: CPT

## 2024-12-19 ENCOUNTER — RX RENEWAL (OUTPATIENT)
Age: 79
End: 2024-12-19

## 2024-12-24 PROBLEM — M19.012 ARTHRITIS OF LEFT SHOULDER REGION: Status: RESOLVED | Noted: 2019-04-01 | Resolved: 2024-12-24

## 2025-04-22 ENCOUNTER — NON-APPOINTMENT (OUTPATIENT)
Age: 80
End: 2025-04-22

## 2025-04-23 ENCOUNTER — APPOINTMENT (OUTPATIENT)
Dept: PULMONOLOGY | Facility: CLINIC | Age: 80
End: 2025-04-23
Payer: MEDICARE

## 2025-04-23 VITALS
HEART RATE: 94 BPM | HEIGHT: 61 IN | BODY MASS INDEX: 15.86 KG/M2 | SYSTOLIC BLOOD PRESSURE: 94 MMHG | OXYGEN SATURATION: 97 % | DIASTOLIC BLOOD PRESSURE: 62 MMHG | WEIGHT: 84 LBS

## 2025-04-23 DIAGNOSIS — J84.10 PULMONARY FIBROSIS, UNSPECIFIED: ICD-10-CM

## 2025-04-23 DIAGNOSIS — F41.9 ANXIETY DISORDER, UNSPECIFIED: ICD-10-CM

## 2025-04-23 PROCEDURE — 99213 OFFICE O/P EST LOW 20 MIN: CPT | Mod: 25

## 2025-04-23 PROCEDURE — 71046 X-RAY EXAM CHEST 2 VIEWS: CPT

## 2025-04-23 PROCEDURE — 94010 BREATHING CAPACITY TEST: CPT

## 2025-04-24 ENCOUNTER — APPOINTMENT (OUTPATIENT)
Dept: RHEUMATOLOGY | Facility: CLINIC | Age: 80
End: 2025-04-24
Payer: MEDICARE

## 2025-04-24 DIAGNOSIS — M53.3 SACROCOCCYGEAL DISORDERS, NOT ELSEWHERE CLASSIFIED: ICD-10-CM

## 2025-04-24 DIAGNOSIS — Z51.81 ENCOUNTER FOR THERAPEUTIC DRUG LVL MONITORING: ICD-10-CM

## 2025-04-24 DIAGNOSIS — M06.9 RHEUMATOID ARTHRITIS, UNSPECIFIED: ICD-10-CM

## 2025-04-24 DIAGNOSIS — Z79.899 OTHER LONG TERM (CURRENT) DRUG THERAPY: ICD-10-CM

## 2025-04-24 PROCEDURE — 99214 OFFICE O/P EST MOD 30 MIN: CPT | Mod: 2W

## 2025-04-24 PROCEDURE — G2211 COMPLEX E/M VISIT ADD ON: CPT | Mod: 2W

## 2025-04-30 ENCOUNTER — APPOINTMENT (OUTPATIENT)
Dept: RADIOLOGY | Facility: CLINIC | Age: 80
End: 2025-04-30

## 2025-05-01 ENCOUNTER — APPOINTMENT (OUTPATIENT)
Dept: ENDOCRINOLOGY | Facility: CLINIC | Age: 80
End: 2025-05-01
Payer: MEDICARE

## 2025-05-01 VITALS — DIASTOLIC BLOOD PRESSURE: 56 MMHG | SYSTOLIC BLOOD PRESSURE: 109 MMHG

## 2025-05-01 PROCEDURE — 99204 OFFICE O/P NEW MOD 45 MIN: CPT

## 2025-05-01 PROCEDURE — G2211 COMPLEX E/M VISIT ADD ON: CPT

## 2025-05-05 ENCOUNTER — APPOINTMENT (OUTPATIENT)
Dept: ENDOCRINOLOGY | Facility: CLINIC | Age: 80
End: 2025-05-05
Payer: MEDICARE

## 2025-05-05 DIAGNOSIS — M81.0 AGE-RELATED OSTEOPOROSIS W/OUT CURRENT PATHOLOGICAL FRACTURE: ICD-10-CM

## 2025-05-05 PROCEDURE — 99212 OFFICE O/P EST SF 10 MIN: CPT | Mod: 93

## 2025-05-06 ENCOUNTER — APPOINTMENT (OUTPATIENT)
Dept: RHEUMATOLOGY | Facility: CLINIC | Age: 80
End: 2025-05-06
Payer: MEDICARE

## 2025-05-06 PROCEDURE — G2211 COMPLEX E/M VISIT ADD ON: CPT

## 2025-05-06 PROCEDURE — 99214 OFFICE O/P EST MOD 30 MIN: CPT

## 2025-05-13 ENCOUNTER — RX RENEWAL (OUTPATIENT)
Age: 80
End: 2025-05-13

## 2025-05-15 DIAGNOSIS — Z79.899 OTHER LONG TERM (CURRENT) DRUG THERAPY: ICD-10-CM

## 2025-05-15 DIAGNOSIS — M06.9 RHEUMATOID ARTHRITIS, UNSPECIFIED: ICD-10-CM

## 2025-05-15 DIAGNOSIS — Z51.81 ENCOUNTER FOR THERAPEUTIC DRUG LVL MONITORING: ICD-10-CM

## 2025-07-07 ENCOUNTER — RX RENEWAL (OUTPATIENT)
Age: 80
End: 2025-07-07

## 2025-07-18 ENCOUNTER — APPOINTMENT (OUTPATIENT)
Dept: INTERNAL MEDICINE | Facility: CLINIC | Age: 80
End: 2025-07-18

## 2025-07-22 ENCOUNTER — LABORATORY RESULT (OUTPATIENT)
Age: 80
End: 2025-07-22

## 2025-07-28 ENCOUNTER — APPOINTMENT (OUTPATIENT)
Dept: FAMILY MEDICINE | Facility: CLINIC | Age: 80
End: 2025-07-28
Payer: MEDICARE

## 2025-07-28 VITALS
SYSTOLIC BLOOD PRESSURE: 104 MMHG | TEMPERATURE: 97.7 F | BODY MASS INDEX: 14.77 KG/M2 | HEIGHT: 61 IN | OXYGEN SATURATION: 100 % | DIASTOLIC BLOOD PRESSURE: 65 MMHG | WEIGHT: 78.25 LBS | HEART RATE: 75 BPM

## 2025-07-28 DIAGNOSIS — Z00.00 ENCOUNTER FOR GENERAL ADULT MEDICAL EXAMINATION W/OUT ABNORMAL FINDINGS: ICD-10-CM

## 2025-07-28 PROCEDURE — G0439: CPT | Mod: GC

## 2025-08-12 ENCOUNTER — APPOINTMENT (OUTPATIENT)
Dept: RHEUMATOLOGY | Facility: CLINIC | Age: 80
End: 2025-08-12
Payer: MEDICARE

## 2025-08-12 DIAGNOSIS — Z79.899 OTHER LONG TERM (CURRENT) DRUG THERAPY: ICD-10-CM

## 2025-08-12 DIAGNOSIS — Z51.81 ENCOUNTER FOR THERAPEUTIC DRUG LVL MONITORING: ICD-10-CM

## 2025-08-12 PROCEDURE — 99214 OFFICE O/P EST MOD 30 MIN: CPT | Mod: 2W

## 2025-08-12 PROCEDURE — G2211 COMPLEX E/M VISIT ADD ON: CPT | Mod: 2W

## 2025-08-17 PROBLEM — Z79.899 HIGH RISK MEDICATION USE: Status: ACTIVE | Noted: 2025-08-17

## 2025-08-18 ENCOUNTER — APPOINTMENT (OUTPATIENT)
Dept: FAMILY MEDICINE | Facility: CLINIC | Age: 80
End: 2025-08-18
Payer: MEDICARE

## 2025-08-18 ENCOUNTER — APPOINTMENT (OUTPATIENT)
Dept: FAMILY MEDICINE | Facility: CLINIC | Age: 80
End: 2025-08-18

## 2025-08-18 DIAGNOSIS — R62.7 ADULT FAILURE TO THRIVE: ICD-10-CM

## 2025-08-18 DIAGNOSIS — E87.1 HYPO-OSMOLALITY AND HYPONATREMIA: ICD-10-CM

## 2025-08-18 DIAGNOSIS — M06.9 RHEUMATOID ARTHRITIS, UNSPECIFIED: ICD-10-CM

## 2025-08-18 DIAGNOSIS — K21.9 GASTRO-ESOPHAGEAL REFLUX DISEASE W/OUT ESOPHAGITIS: ICD-10-CM

## 2025-08-18 DIAGNOSIS — R13.10 DYSPHAGIA, UNSPECIFIED: ICD-10-CM

## 2025-08-18 DIAGNOSIS — F41.9 ANXIETY DISORDER, UNSPECIFIED: ICD-10-CM

## 2025-08-18 DIAGNOSIS — R63.6 UNDERWEIGHT: ICD-10-CM

## 2025-08-18 PROCEDURE — 99213 OFFICE O/P EST LOW 20 MIN: CPT | Mod: GC,93

## 2025-09-24 PROBLEM — Z66 DNR (DO NOT RESUSCITATE): Status: ACTIVE | Noted: 2025-09-24

## 2025-09-24 PROBLEM — Z71.89 GOALS OF CARE, COUNSELING/DISCUSSION: Status: ACTIVE | Noted: 2025-09-24

## 2025-09-24 PROBLEM — Z23 ENCOUNTER FOR IMMUNIZATION: Status: ACTIVE | Noted: 2024-09-20

## 2025-09-24 PROBLEM — Z78.9 DNI (DO NOT INTUBATE): Status: ACTIVE | Noted: 2025-09-24

## (undated) DEVICE — SUT MONOSOF 2-0 18" C-15

## (undated) DEVICE — SAW BLADE LINVATEC SAGITTAL 19.5X41.0X..4MM COARSE

## (undated) DEVICE — NDL HYPO SAFE 22G X 1.5" (BLACK)

## (undated) DEVICE — DRSG COMBINE 5X9"

## (undated) DEVICE — VENODYNE/SCD SLEEVE CALF LARGE

## (undated) DEVICE — SOL IRR POUR H2O 1000ML

## (undated) DEVICE — SYR LUER LOK 10CC

## (undated) DEVICE — DRAPE 3/4 SHEET W REINFORCEMENT 56X77"

## (undated) DEVICE — PACK LOWER EXTREMITY NS PLAINVI

## (undated) DEVICE — NDL HYPO SAFE 18G X 1.5" (PINK)

## (undated) DEVICE — SOL IRR POUR NS 0.9% 1000ML

## (undated) DEVICE — DRSG XEROFORM 1 X 8"

## (undated) DEVICE — VENODYNE/SCD SLEEVE CALF MEDIUM

## (undated) DEVICE — BLADE SCALPEL SAFETYLOCK #15

## (undated) DEVICE — PREP ALCOHOL PAD MED

## (undated) DEVICE — GLV 7.5 PROTEXIS (WHITE)

## (undated) DEVICE — SUT POLYSORB 2-0 30" GS-22 UNDYED

## (undated) DEVICE — PREP BETADINE KIT

## (undated) DEVICE — SUT POLYSORB 4-0 18" P-12 UNDYED

## (undated) DEVICE — FLOORMAT SURGISAFE ABSORBANT WHITE 36" X 72"

## (undated) DEVICE — SAW BLADE LINVATEC SAGITTAL MIC 9.5X25.5X0.4MM

## (undated) DEVICE — DRSG TELFA 3 X 8

## (undated) DEVICE — SPECIMEN CONTAINER 4OZ

## (undated) DEVICE — BUR MICROAIRE CARBIDE OVAL 4MM 8 FLUTE

## (undated) DEVICE — BLADE SCALPEL SAFETYLOCK #10

## (undated) DEVICE — SUT MONOSOF 3-0 18" P-14

## (undated) DEVICE — PLV-SCD MACHINE: Type: DURABLE MEDICAL EQUIPMENT

## (undated) DEVICE — WARMING BLANKET UPPER ADULT

## (undated) DEVICE — DRSG KERLIX ROLL 4.5"

## (undated) DEVICE — DRSG CURITY GAUZE SPONGE 4 X 4" 12-PLY